# Patient Record
Sex: FEMALE | Race: WHITE | NOT HISPANIC OR LATINO
[De-identification: names, ages, dates, MRNs, and addresses within clinical notes are randomized per-mention and may not be internally consistent; named-entity substitution may affect disease eponyms.]

---

## 2023-07-09 ENCOUNTER — NON-APPOINTMENT (OUTPATIENT)
Age: 23
End: 2023-07-09

## 2023-07-10 ENCOUNTER — RESULT REVIEW (OUTPATIENT)
Age: 23
End: 2023-07-10

## 2024-09-04 ENCOUNTER — APPOINTMENT (OUTPATIENT)
Dept: OBGYN | Facility: CLINIC | Age: 24
End: 2024-09-04

## 2024-09-04 ENCOUNTER — APPOINTMENT (OUTPATIENT)
Dept: OBGYN | Facility: CLINIC | Age: 24
End: 2024-09-04
Payer: COMMERCIAL

## 2024-09-04 PROCEDURE — 76856 US EXAM PELVIC COMPLETE: CPT | Mod: 59

## 2024-09-04 PROCEDURE — 36415 COLL VENOUS BLD VENIPUNCTURE: CPT

## 2024-09-04 PROCEDURE — 81025 URINE PREGNANCY TEST: CPT

## 2024-09-04 PROCEDURE — 81002 URINALYSIS NONAUTO W/O SCOPE: CPT

## 2024-09-04 PROCEDURE — 76830 TRANSVAGINAL US NON-OB: CPT

## 2024-09-04 PROCEDURE — 99203 OFFICE O/P NEW LOW 30 MIN: CPT | Mod: 25

## 2024-09-17 ENCOUNTER — APPOINTMENT (OUTPATIENT)
Dept: OBGYN | Facility: CLINIC | Age: 24
End: 2024-09-17
Payer: COMMERCIAL

## 2024-09-17 PROCEDURE — 76817 TRANSVAGINAL US OBSTETRIC: CPT

## 2024-09-17 PROCEDURE — 36415 COLL VENOUS BLD VENIPUNCTURE: CPT

## 2024-09-17 PROCEDURE — 99214 OFFICE O/P EST MOD 30 MIN: CPT

## 2024-09-17 PROCEDURE — 81002 URINALYSIS NONAUTO W/O SCOPE: CPT

## 2024-10-08 ENCOUNTER — APPOINTMENT (OUTPATIENT)
Dept: OBGYN | Facility: CLINIC | Age: 24
End: 2024-10-08
Payer: COMMERCIAL

## 2024-10-08 PROCEDURE — 81002 URINALYSIS NONAUTO W/O SCOPE: CPT

## 2024-10-08 PROCEDURE — 0502F SUBSEQUENT PRENATAL CARE: CPT

## 2024-10-08 PROCEDURE — 76817 TRANSVAGINAL US OBSTETRIC: CPT

## 2024-10-23 ENCOUNTER — ASOB RESULT (OUTPATIENT)
Age: 24
End: 2024-10-23

## 2024-10-23 ENCOUNTER — APPOINTMENT (OUTPATIENT)
Dept: ANTEPARTUM | Facility: CLINIC | Age: 24
End: 2024-10-23

## 2024-10-23 PROCEDURE — 76801 OB US < 14 WKS SINGLE FETUS: CPT | Mod: 59

## 2024-10-23 PROCEDURE — 76813 OB US NUCHAL MEAS 1 GEST: CPT

## 2024-11-19 ENCOUNTER — APPOINTMENT (OUTPATIENT)
Dept: OBGYN | Facility: CLINIC | Age: 24
End: 2024-11-19
Payer: COMMERCIAL

## 2024-11-19 PROCEDURE — 0502F SUBSEQUENT PRENATAL CARE: CPT

## 2024-11-19 PROCEDURE — 81002 URINALYSIS NONAUTO W/O SCOPE: CPT

## 2024-11-19 PROCEDURE — 36415 COLL VENOUS BLD VENIPUNCTURE: CPT

## 2024-12-20 ENCOUNTER — ASOB RESULT (OUTPATIENT)
Age: 24
End: 2024-12-20

## 2024-12-20 ENCOUNTER — APPOINTMENT (OUTPATIENT)
Dept: ANTEPARTUM | Facility: CLINIC | Age: 24
End: 2024-12-20
Payer: COMMERCIAL

## 2024-12-20 DIAGNOSIS — O35.9XX0 MATERNAL CARE FOR (SUSPECTED) FETAL ABNORMALITY AND DAMAGE, UNSPECIFIED, NOT APPLICABLE OR UNSPECIFIED: ICD-10-CM

## 2024-12-20 PROCEDURE — 76805 OB US >/= 14 WKS SNGL FETUS: CPT

## 2025-01-02 ENCOUNTER — APPOINTMENT (OUTPATIENT)
Dept: PEDIATRIC CARDIOLOGY | Facility: CLINIC | Age: 25
End: 2025-01-02
Payer: COMMERCIAL

## 2025-01-02 PROCEDURE — 76825 ECHO EXAM OF FETAL HEART: CPT

## 2025-01-02 PROCEDURE — 76820 UMBILICAL ARTERY ECHO: CPT

## 2025-01-02 PROCEDURE — 76821 MIDDLE CEREBRAL ARTERY ECHO: CPT

## 2025-01-02 PROCEDURE — 99203 OFFICE O/P NEW LOW 30 MIN: CPT

## 2025-01-02 PROCEDURE — 76827 ECHO EXAM OF FETAL HEART: CPT

## 2025-01-14 ENCOUNTER — APPOINTMENT (OUTPATIENT)
Dept: OBGYN | Facility: CLINIC | Age: 25
End: 2025-01-14
Payer: COMMERCIAL

## 2025-01-14 PROCEDURE — 81002 URINALYSIS NONAUTO W/O SCOPE: CPT

## 2025-01-14 PROCEDURE — 0502F SUBSEQUENT PRENATAL CARE: CPT

## 2025-01-16 ENCOUNTER — APPOINTMENT (OUTPATIENT)
Dept: PEDIATRIC CARDIOLOGY | Facility: CLINIC | Age: 25
End: 2025-01-16
Payer: COMMERCIAL

## 2025-01-16 PROCEDURE — 76821 MIDDLE CEREBRAL ARTERY ECHO: CPT | Mod: 26

## 2025-01-16 PROCEDURE — 76826 ECHO EXAM OF FETAL HEART: CPT

## 2025-01-16 PROCEDURE — 99212 OFFICE O/P EST SF 10 MIN: CPT

## 2025-01-16 PROCEDURE — 76828 ECHO EXAM OF FETAL HEART: CPT

## 2025-01-16 PROCEDURE — 76820 UMBILICAL ARTERY ECHO: CPT | Mod: 26

## 2025-02-11 ENCOUNTER — APPOINTMENT (OUTPATIENT)
Dept: OBGYN | Facility: CLINIC | Age: 25
End: 2025-02-11
Payer: COMMERCIAL

## 2025-02-11 PROCEDURE — 36415 COLL VENOUS BLD VENIPUNCTURE: CPT

## 2025-02-11 PROCEDURE — 0502F SUBSEQUENT PRENATAL CARE: CPT

## 2025-02-11 PROCEDURE — 81002 URINALYSIS NONAUTO W/O SCOPE: CPT

## 2025-03-04 ENCOUNTER — APPOINTMENT (OUTPATIENT)
Dept: OBGYN | Facility: CLINIC | Age: 25
End: 2025-03-04
Payer: COMMERCIAL

## 2025-03-04 PROCEDURE — 81002 URINALYSIS NONAUTO W/O SCOPE: CPT

## 2025-03-04 PROCEDURE — 0502F SUBSEQUENT PRENATAL CARE: CPT

## 2025-03-19 ENCOUNTER — TRANSCRIPTION ENCOUNTER (OUTPATIENT)
Age: 25
End: 2025-03-19

## 2025-03-19 ENCOUNTER — APPOINTMENT (OUTPATIENT)
Dept: OBGYN | Facility: CLINIC | Age: 25
End: 2025-03-19
Payer: COMMERCIAL

## 2025-03-19 ENCOUNTER — APPOINTMENT (OUTPATIENT)
Dept: ANTEPARTUM | Facility: CLINIC | Age: 25
End: 2025-03-19

## 2025-03-19 PROCEDURE — 81002 URINALYSIS NONAUTO W/O SCOPE: CPT

## 2025-03-19 PROCEDURE — 76816 OB US FOLLOW-UP PER FETUS: CPT

## 2025-03-19 PROCEDURE — 0502F SUBSEQUENT PRENATAL CARE: CPT

## 2025-03-19 PROCEDURE — 76820 UMBILICAL ARTERY ECHO: CPT | Mod: 59

## 2025-03-19 PROCEDURE — 76819 FETAL BIOPHYS PROFIL W/O NST: CPT | Mod: 59

## 2025-03-19 PROCEDURE — 36415 COLL VENOUS BLD VENIPUNCTURE: CPT

## 2025-03-28 ENCOUNTER — APPOINTMENT (OUTPATIENT)
Dept: ANTEPARTUM | Facility: CLINIC | Age: 25
End: 2025-03-28

## 2025-03-28 ENCOUNTER — APPOINTMENT (OUTPATIENT)
Dept: ANTEPARTUM | Facility: CLINIC | Age: 25
End: 2025-03-28
Payer: COMMERCIAL

## 2025-03-28 ENCOUNTER — ASOB RESULT (OUTPATIENT)
Age: 25
End: 2025-03-28

## 2025-03-28 ENCOUNTER — INPATIENT (INPATIENT)
Facility: HOSPITAL | Age: 25
LOS: 8 days | Discharge: HOME HEALTH | End: 2025-04-06
Attending: OBSTETRICS & GYNECOLOGY | Admitting: OBSTETRICS & GYNECOLOGY
Payer: COMMERCIAL

## 2025-03-28 VITALS — TEMPERATURE: 99 F

## 2025-03-28 DIAGNOSIS — O36.5990 MATERNAL CARE FOR OTHER KNOWN OR SUSPECTED POOR FETAL GROWTH, UNSPECIFIED TRIMESTER, NOT APPLICABLE OR UNSPECIFIED: ICD-10-CM

## 2025-03-28 DIAGNOSIS — O14.10 SEVERE PRE-ECLAMPSIA, UNSPECIFIED TRIMESTER: ICD-10-CM

## 2025-03-28 DIAGNOSIS — O26.899 OTHER SPECIFIED PREGNANCY RELATED CONDITIONS, UNSPECIFIED TRIMESTER: ICD-10-CM

## 2025-03-28 LAB
ALBUMIN SERPL ELPH-MCNC: 3.1 G/DL — LOW (ref 3.3–5)
ALP SERPL-CCNC: 238 U/L — HIGH (ref 40–120)
ALT FLD-CCNC: 12 U/L — SIGNIFICANT CHANGE UP (ref 4–33)
ANION GAP SERPL CALC-SCNC: 11 MMOL/L — SIGNIFICANT CHANGE UP (ref 7–14)
APPEARANCE UR: ABNORMAL
AST SERPL-CCNC: 20 U/L — SIGNIFICANT CHANGE UP (ref 4–32)
BACTERIA # UR AUTO: ABNORMAL /HPF
BASOPHILS # BLD AUTO: 0.04 K/UL — SIGNIFICANT CHANGE UP (ref 0–0.2)
BASOPHILS NFR BLD AUTO: 0.4 % — SIGNIFICANT CHANGE UP (ref 0–2)
BILIRUB SERPL-MCNC: <0.2 MG/DL — SIGNIFICANT CHANGE UP (ref 0.2–1.2)
BILIRUB UR-MCNC: NEGATIVE — SIGNIFICANT CHANGE UP
BLD GP AB SCN SERPL QL: NEGATIVE — SIGNIFICANT CHANGE UP
BUN SERPL-MCNC: 19 MG/DL — SIGNIFICANT CHANGE UP (ref 7–23)
CALCIUM SERPL-MCNC: 8.9 MG/DL — SIGNIFICANT CHANGE UP (ref 8.4–10.5)
CAST: 3 /LPF — SIGNIFICANT CHANGE UP (ref 0–4)
CHLORIDE SERPL-SCNC: 105 MMOL/L — SIGNIFICANT CHANGE UP (ref 98–107)
CO2 SERPL-SCNC: 21 MMOL/L — LOW (ref 22–31)
COLOR SPEC: SIGNIFICANT CHANGE UP
CREAT ?TM UR-MCNC: 298 MG/DL — SIGNIFICANT CHANGE UP
CREAT SERPL-MCNC: 0.89 MG/DL — SIGNIFICANT CHANGE UP (ref 0.5–1.3)
DIFF PNL FLD: ABNORMAL
EGFR: 93 ML/MIN/1.73M2 — SIGNIFICANT CHANGE UP
EGFR: 93 ML/MIN/1.73M2 — SIGNIFICANT CHANGE UP
EOSINOPHIL # BLD AUTO: 0.01 K/UL — SIGNIFICANT CHANGE UP (ref 0–0.5)
EOSINOPHIL NFR BLD AUTO: 0.1 % — SIGNIFICANT CHANGE UP (ref 0–6)
FINE GRAN CASTS #/AREA URNS AUTO: PRESENT
GLUCOSE SERPL-MCNC: 83 MG/DL — SIGNIFICANT CHANGE UP (ref 70–99)
GLUCOSE UR QL: NEGATIVE MG/DL — SIGNIFICANT CHANGE UP
HCT VFR BLD CALC: 33.7 % — LOW (ref 34.5–45)
HGB BLD-MCNC: 11.3 G/DL — LOW (ref 11.5–15.5)
IANC: 8.44 K/UL — HIGH (ref 1.8–7.4)
IMM GRANULOCYTES NFR BLD AUTO: 0.4 % — SIGNIFICANT CHANGE UP (ref 0–0.9)
KETONES UR-MCNC: ABNORMAL MG/DL
LDH SERPL L TO P-CCNC: 260 U/L — HIGH (ref 135–225)
LEUKOCYTE ESTERASE UR-ACNC: NEGATIVE — SIGNIFICANT CHANGE UP
LYMPHOCYTES # BLD AUTO: 1.53 K/UL — SIGNIFICANT CHANGE UP (ref 1–3.3)
LYMPHOCYTES # BLD AUTO: 14.1 % — SIGNIFICANT CHANGE UP (ref 13–44)
MAGNESIUM SERPL-MCNC: 6.8 MG/DL — HIGH (ref 1.6–2.6)
MCHC RBC-ENTMCNC: 28.5 PG — SIGNIFICANT CHANGE UP (ref 27–34)
MCHC RBC-ENTMCNC: 33.5 G/DL — SIGNIFICANT CHANGE UP (ref 32–36)
MCV RBC AUTO: 84.9 FL — SIGNIFICANT CHANGE UP (ref 80–100)
MONOCYTES # BLD AUTO: 0.79 K/UL — SIGNIFICANT CHANGE UP (ref 0–0.9)
MONOCYTES NFR BLD AUTO: 7.3 % — SIGNIFICANT CHANGE UP (ref 2–14)
NEUTROPHILS # BLD AUTO: 8.44 K/UL — HIGH (ref 1.8–7.4)
NEUTROPHILS NFR BLD AUTO: 77.7 % — HIGH (ref 43–77)
NITRITE UR-MCNC: NEGATIVE — SIGNIFICANT CHANGE UP
NRBC # BLD AUTO: 0 K/UL — SIGNIFICANT CHANGE UP (ref 0–0)
NRBC # FLD: 0 K/UL — SIGNIFICANT CHANGE UP (ref 0–0)
NRBC BLD AUTO-RTO: 0 /100 WBCS — SIGNIFICANT CHANGE UP (ref 0–0)
PH UR: 6 — SIGNIFICANT CHANGE UP (ref 5–8)
PLATELET # BLD AUTO: 258 K/UL — SIGNIFICANT CHANGE UP (ref 150–400)
POTASSIUM SERPL-MCNC: 4.1 MMOL/L — SIGNIFICANT CHANGE UP (ref 3.5–5.3)
POTASSIUM SERPL-SCNC: 4.1 MMOL/L — SIGNIFICANT CHANGE UP (ref 3.5–5.3)
PROT ?TM UR-MCNC: >2000 MG/DL — SIGNIFICANT CHANGE UP
PROT SERPL-MCNC: 6 G/DL — SIGNIFICANT CHANGE UP (ref 6–8.3)
PROT UR-MCNC: >=1000 MG/DL
PROT/CREAT UR-RTO: SIGNIFICANT CHANGE UP RATIO (ref 0–0.2)
RBC # BLD: 3.97 M/UL — SIGNIFICANT CHANGE UP (ref 3.8–5.2)
RBC # FLD: 11.9 % — SIGNIFICANT CHANGE UP (ref 10.3–14.5)
RBC CASTS # UR COMP ASSIST: 2 /HPF — SIGNIFICANT CHANGE UP (ref 0–4)
REVIEW: SIGNIFICANT CHANGE UP
RH IG SCN BLD-IMP: POSITIVE — SIGNIFICANT CHANGE UP
RH IG SCN BLD-IMP: POSITIVE — SIGNIFICANT CHANGE UP
SODIUM SERPL-SCNC: 137 MMOL/L — SIGNIFICANT CHANGE UP (ref 135–145)
SP GR SPEC: 1.04 — HIGH (ref 1–1.03)
SQUAMOUS # UR AUTO: 10 /HPF — HIGH (ref 0–5)
T PALLIDUM AB TITR SER: NEGATIVE — SIGNIFICANT CHANGE UP
URATE SERPL-MCNC: 5.4 MG/DL — SIGNIFICANT CHANGE UP (ref 2.5–7)
UROBILINOGEN FLD QL: 1 MG/DL — SIGNIFICANT CHANGE UP (ref 0.2–1)
WBC # BLD: 10.85 K/UL — HIGH (ref 3.8–10.5)
WBC # FLD AUTO: 10.85 K/UL — HIGH (ref 3.8–10.5)
WBC UR QL: 5 /HPF — SIGNIFICANT CHANGE UP (ref 0–5)

## 2025-03-28 PROCEDURE — 76819 FETAL BIOPHYS PROFIL W/O NST: CPT

## 2025-03-28 PROCEDURE — 99223 1ST HOSP IP/OBS HIGH 75: CPT | Mod: 25

## 2025-03-28 PROCEDURE — 59200 INSERT CERVICAL DILATOR: CPT

## 2025-03-28 PROCEDURE — 76815 OB US LIMITED FETUS(S): CPT | Mod: 26

## 2025-03-28 PROCEDURE — 76818 FETAL BIOPHYS PROFILE W/NST: CPT | Mod: 26,59

## 2025-03-28 RX ORDER — NIFEDIPINE 30 MG
30 TABLET, EXTENDED RELEASE 24 HR ORAL ONCE
Refills: 0 | Status: COMPLETED | OUTPATIENT
Start: 2025-03-28 | End: 2025-03-28

## 2025-03-28 RX ORDER — LABETALOL HYDROCHLORIDE 200 MG/1
20 TABLET, FILM COATED ORAL ONCE
Refills: 0 | Status: COMPLETED | OUTPATIENT
Start: 2025-03-28 | End: 2025-03-28

## 2025-03-28 RX ORDER — SODIUM CHLORIDE 9 G/1000ML
1000 INJECTION, SOLUTION INTRAVENOUS
Refills: 0 | Status: DISCONTINUED | OUTPATIENT
Start: 2025-03-28 | End: 2025-03-29

## 2025-03-28 RX ORDER — LABETALOL HYDROCHLORIDE 200 MG/1
40 TABLET, FILM COATED ORAL ONCE
Refills: 0 | Status: COMPLETED | OUTPATIENT
Start: 2025-03-28 | End: 2025-03-28

## 2025-03-28 RX ORDER — MAGNESIUM SULFATE 500 MG/ML
4 SYRINGE (ML) INJECTION ONCE
Refills: 0 | Status: COMPLETED | OUTPATIENT
Start: 2025-03-28 | End: 2025-03-28

## 2025-03-28 RX ORDER — OXYTOCIN-SODIUM CHLORIDE 0.9% IV SOLN 30 UNIT/500ML 30-0.9/5 UT/ML-%
SOLUTION INTRAVENOUS
Qty: 30 | Refills: 0 | Status: DISCONTINUED | OUTPATIENT
Start: 2025-03-28 | End: 2025-03-28

## 2025-03-28 RX ORDER — MAGNESIUM SULFATE 500 MG/ML
2 SYRINGE (ML) INJECTION
Qty: 40 | Refills: 0 | Status: DISCONTINUED | OUTPATIENT
Start: 2025-03-28 | End: 2025-03-29

## 2025-03-28 RX ORDER — SODIUM CHLORIDE 9 G/1000ML
1000 INJECTION, SOLUTION INTRAVENOUS
Refills: 0 | Status: DISCONTINUED | OUTPATIENT
Start: 2025-03-28 | End: 2025-03-28

## 2025-03-28 RX ORDER — NIFEDIPINE 30 MG
60 TABLET, EXTENDED RELEASE 24 HR ORAL DAILY
Refills: 0 | Status: DISCONTINUED | OUTPATIENT
Start: 2025-03-28 | End: 2025-03-29

## 2025-03-28 RX ORDER — LABETALOL HYDROCHLORIDE 200 MG/1
80 TABLET, FILM COATED ORAL ONCE
Refills: 0 | Status: COMPLETED | OUTPATIENT
Start: 2025-03-28 | End: 2025-03-28

## 2025-03-28 RX ORDER — AMPICILLIN SODIUM 1 G/1
2 INJECTION, POWDER, FOR SOLUTION INTRAMUSCULAR; INTRAVENOUS ONCE
Refills: 0 | Status: COMPLETED | OUTPATIENT
Start: 2025-03-28 | End: 2025-03-28

## 2025-03-28 RX ORDER — NIFEDIPINE 30 MG
20 TABLET, EXTENDED RELEASE 24 HR ORAL ONCE
Refills: 0 | Status: DISCONTINUED | OUTPATIENT
Start: 2025-03-28 | End: 2025-03-29

## 2025-03-28 RX ORDER — OXYTOCIN-SODIUM CHLORIDE 0.9% IV SOLN 30 UNIT/500ML 30-0.9/5 UT/ML-%
SOLUTION INTRAVENOUS
Qty: 30 | Refills: 0 | Status: DISCONTINUED | OUTPATIENT
Start: 2025-03-28 | End: 2025-03-29

## 2025-03-28 RX ORDER — AMPICILLIN SODIUM 1 G/1
1 INJECTION, POWDER, FOR SOLUTION INTRAMUSCULAR; INTRAVENOUS EVERY 4 HOURS
Refills: 0 | Status: DISCONTINUED | OUTPATIENT
Start: 2025-03-28 | End: 2025-03-29

## 2025-03-28 RX ORDER — OXYTOCIN-SODIUM CHLORIDE 0.9% IV SOLN 30 UNIT/500ML 30-0.9/5 UT/ML-%
167 SOLUTION INTRAVENOUS
Qty: 30 | Refills: 0 | Status: DISCONTINUED | OUTPATIENT
Start: 2025-03-28 | End: 2025-03-30

## 2025-03-28 RX ORDER — NIFEDIPINE 30 MG
10 TABLET, EXTENDED RELEASE 24 HR ORAL ONCE
Refills: 0 | Status: COMPLETED | OUTPATIENT
Start: 2025-03-28 | End: 2025-03-28

## 2025-03-28 RX ORDER — NIFEDIPINE 30 MG
30 TABLET, EXTENDED RELEASE 24 HR ORAL DAILY
Refills: 0 | Status: DISCONTINUED | OUTPATIENT
Start: 2025-03-28 | End: 2025-03-28

## 2025-03-28 RX ADMIN — Medication 12 MILLIGRAM(S): at 12:23

## 2025-03-28 RX ADMIN — SODIUM CHLORIDE 50 MILLILITER(S): 9 INJECTION, SOLUTION INTRAVENOUS at 11:12

## 2025-03-28 RX ADMIN — Medication 30 MILLIGRAM(S): at 15:26

## 2025-03-28 RX ADMIN — LABETALOL HYDROCHLORIDE 80 MILLIGRAM(S): 200 TABLET, FILM COATED ORAL at 11:17

## 2025-03-28 RX ADMIN — Medication 3 MILLILITER(S): at 16:36

## 2025-03-28 RX ADMIN — LABETALOL HYDROCHLORIDE 80 MILLIGRAM(S): 200 TABLET, FILM COATED ORAL at 15:27

## 2025-03-28 RX ADMIN — LABETALOL HYDROCHLORIDE 20 MILLIGRAM(S): 200 TABLET, FILM COATED ORAL at 10:08

## 2025-03-28 RX ADMIN — AMPICILLIN SODIUM 200 GRAM(S): 1 INJECTION, POWDER, FOR SOLUTION INTRAMUSCULAR; INTRAVENOUS at 18:56

## 2025-03-28 RX ADMIN — Medication 30 MILLIGRAM(S): at 12:05

## 2025-03-28 RX ADMIN — Medication 50 GM/HR: at 11:29

## 2025-03-28 RX ADMIN — Medication 10 MILLIGRAM(S): at 14:43

## 2025-03-28 RX ADMIN — Medication 50 GM/HR: at 19:38

## 2025-03-28 RX ADMIN — LABETALOL HYDROCHLORIDE 40 MILLIGRAM(S): 200 TABLET, FILM COATED ORAL at 10:33

## 2025-03-28 RX ADMIN — SODIUM CHLORIDE 50 MILLILITER(S): 9 INJECTION, SOLUTION INTRAVENOUS at 19:37

## 2025-03-28 RX ADMIN — Medication 300 GRAM(S): at 11:07

## 2025-03-28 RX ADMIN — Medication 3 MILLILITER(S): at 22:48

## 2025-03-28 RX ADMIN — AMPICILLIN SODIUM 100 GRAM(S): 1 INJECTION, POWDER, FOR SOLUTION INTRAMUSCULAR; INTRAVENOUS at 22:59

## 2025-03-28 RX ADMIN — OXYTOCIN-SODIUM CHLORIDE 0.9% IV SOLN 30 UNIT/500ML 2 MILLIUNIT(S)/MIN: 30-0.9/5 SOLUTION at 19:37

## 2025-03-28 RX ADMIN — LABETALOL HYDROCHLORIDE 20 MILLIGRAM(S): 200 TABLET, FILM COATED ORAL at 14:38

## 2025-03-28 NOTE — OB PROVIDER LABOR PROGRESS NOTE - ASSESSMENT
24y G1 @34w4d admitted for IOL for sPEC    - Continue Mg gtt for sPEC  - To start induction    Crissy Shaw PGY2

## 2025-03-28 NOTE — OB PROVIDER LABOR PROGRESS NOTE - ASSESSMENT
IOL for sPEC  - making cervical change  - not AROMable at this time  - cervical balloon still in place A  - start janette Davis, PGY2

## 2025-03-28 NOTE — OB PROVIDER H&P - ASSESSMENT
25 y/o  @ 34.4 wks gestation with sPEC   plan of care d/w dr Tomlinson   admit to l&D  sPEC @ 34.4 wks gestation for Labetalol / Betamethasone / Magnesium Sulfate   see admission orders 23 y/o  @ 34.4 wks gestation with sPEC / SGA   plan of care d/w dr Tomlinson / Dr Beckham   admit to l&D  sPEC @ 34.4 wks gestation for Labetalol / Betamethasone / Magnesium Sulfate / Procardia   see admission orders

## 2025-03-28 NOTE — OB PROVIDER H&P - NSHPPHYSICALEXAM_GEN_ALL_CORE
GENERAL: pt in  NAD,   HEAD:  Atraumatic, normocephalic  EYES: PERRLA, conjunctiva and sclera clear  NECK: Supple, nontender   HEART: Regular rate and rhythm,   LUNGS: Unlabored respirations.  Clear to auscultation bilaterally   ABDOMEN: Soft, nontender, gravid     EXTREMITIES: 2+ peripheral pulses bilaterally. No clubbing, cyanosis, or edema  NERVOUS SYSTEM:  A&Ox3, moving all extremities, no focal deficits   SKIN: No rashes or lesions    vitals   T(C): 37.1 (03-28-25 @ 10:01), Max: 37.1 (03-28-25 @ 10:01)  HR: 111 (03-28-25 @ 10:03) (111 - 111)  BP: 187/131 (03-28-25 @ 10:03) (187/131 - 187/131) high fowlers position   1008 Labetalol 20 mg IVP   1019 179/107 P: 76    NST  Baseline  (   140       ) BPM  Variability (  X)  Moderate   (  ) Minimal  (  ) Absent  (  )  Marked  Accelerations ( X ) 15x15   (  ) 10x10  (  ) no  Decelerations ( X ) no  (  ) Variable  (  ) Early  (  ) Late      Description _________  Contractions ( X ) no  (  ) yes     Description  __________  Interpretation ( X ) reactive   (  )  non-reactive GENERAL: pt in  NAD,   HEAD:  Atraumatic, normocephalic  EYES: PERRLA, conjunctiva and sclera clear  NECK: Supple, nontender   HEART: Regular rate and rhythm,   LUNGS: Unlabored respirations.  Clear to auscultation bilaterally   ABDOMEN: Soft, nontender, gravid     EXTREMITIES: 2+ peripheral pulses bilaterally. No clubbing, cyanosis, or edema  NERVOUS SYSTEM:  A&Ox3, moving all extremities, no focal deficits   SKIN: No rashes or lesions    TAS cephalic to confirm presentation     vitals   T(C): 37.1 (03-28-25 @ 10:01), Max: 37.1 (03-28-25 @ 10:01)  HR: 111 (03-28-25 @ 10:03) (111 - 111)  BP: 187/131 (03-28-25 @ 10:03) (187/131 - 187/131) high fowlers position   1008 Labetalol 20 mg IVP   1019 179/107 P: 76  1029 186/115 P: 80  1034 Labetalol 40 mg IVP   1050 158/113 P: 85  NST  Baseline  (   140       ) BPM  Variability (  X)  Moderate   (  ) Minimal  (  ) Absent  (  )  Marked  Accelerations ( X ) 15x15   (  ) 10x10  (  ) no  Decelerations ( X ) no  (  ) Variable  (  ) Early  (  ) Late      Description _________  Contractions ( X ) no  (  ) yes     Description  __________  Interpretation ( X ) reactive   (  )  non-reactive

## 2025-03-28 NOTE — OB RN TRIAGE NOTE - FALL HARM RISK - UNIVERSAL INTERVENTIONS
Bed in lowest position, wheels locked, appropriate side rails in place/Call bell, personal items and telephone in reach/Instruct patient to call for assistance before getting out of bed or chair/Non-slip footwear when patient is out of bed/[place] to call system/Physically safe environment - no spills, clutter or unnecessary equipment/Purposeful Proactive Rounding/Room/bathroom lighting operational, light cord in reach

## 2025-03-28 NOTE — OB RN PATIENT PROFILE - ABILITY TO HEAR (WITH HEARING AID OR HEARING APPLIANCE IF NORMALLY USED):
12/16/20 1555   Oxygen Therapy   O2 Sat (%) 100 %   Pulse via Oximetry 57 beats per minute   O2 Device BIPAP   FIO2 (%) 40 %   Respiratory   Respiratory (WDL) X   CPAP/BIPAP   CPAP/BIPAP Start/Stop On   Device Mode BIPAP   $$ Bipap Daily Yes   Mask Type and Size Large  (non-vented)   Skin Condition intact   PIP Observed 18 cm H20   IPAP (cm H2O) 14 cm H2O   EPAP (cm H2O) 8 cm H2O   Inspiratory Time (sec) 0.9 seconds   Vt Spont (ml) 1110 ml   Ve Observed (l/min) 29.3 l/min   Backup Rate 8   Total RR (Spontaneous) 26 breaths per minute   Insp Rise Time (sec) 3   Leak (Estimated) 6 L/min   Pt's Home Machine No   Biomedical Check Performed Yes   Settings Verified Yes   Alarm Settings   High Pressure 30   Low Pressure 5   Apnea 20   Low Ve 3   High Rate 50   Low Rate 8   Pulmonary Toilet   Pulmonary Toilet Cough and deep breath;H. O.B elevated Adequate: hears normal conversation without difficulty

## 2025-03-28 NOTE — OB PROVIDER LABOR PROGRESS NOTE - ASSESSMENT
24y G1 @34w4d admitted for IOL for sPEC    - Cervical balloon placed in usual fashion  - IOL with buccal cytotec  - Continue Mg gtt for sPEC    Plan per Dr. Davi Shaw PGY2

## 2025-03-28 NOTE — CONSULT NOTE PEDS - SUBJECTIVE AND OBJECTIVE BOX
Ms. Gomez is a 25 y/o  admitted at 34w4d gestational age. Prenatal history notable for sPEC diagnosed on 3/28. Pregnancy complicated by IUGR (estimated fetal weight is 4lb 12oz).     I met with Ms. Gomez and her partner and discussed what to expect should she deliver at 33 weeks gestation. We discussed the followin. The NICU team will be present at her delivery and will immediately assess and care for her infant.    2. The infant may require respiratory support, most commonly in the form of nasal CPAP. While unlikely, there is a small possibility that the infant would require intubation and mechanical ventilation.    3. Depending on the clinical status of the infant, enteral feedings may or may not be started immediately. The infant will receive IVF/IV nutrition as necessary. Due to immature suck/swallow, orogastric or nasogastric tube may be required once feeds are initiated. The infant is also at risk for hypoglycemia due to prematurity.    4. Discussed the benefits of breastfeeding in  infants and encouraged mother to pump following delivery.    5. The infant will be at risk for jaundice which can be treated with phototherapy.    6. The infant will be screened for infection and treated with antibiotics if deemed clinically necessary.    7. The infant is at risk for thermoregulation issues.    8. The infant is at risk for developmental delays as a consequence of prematurity. The infant will be evaluated by a developmental pediatrician to monitor for neurodevelopmental delays.    9. Length of stay is highly variable, but at this gestational age, the average length of stay is 10 days. Reviewed discharge criteria.    Ms. Gomez and her partner had the opportunity to ask questions and may contact the NICU at any time if further questions arise.    Thank you for the opportunity to participate in the care of this patient and please inform us of any changes in her status.

## 2025-03-28 NOTE — OB PROVIDER H&P - HISTORY OF PRESENT ILLNESS
PNC with Dr Salinas   25 y/o  @ 34.4 wks gestation presents from [facility] with elevated BP's of 153/96 and 170/ 110 , pt being followed by M for SGA EFW: 4#12 12% and AC 6% BPP was 8/8 pt denies any headache visual disturbances or right upper epigastric pain denies any uc's vb or lof reports +FM denies any n/v/d denies any fever or chills ap care comp by :  SGA   EFW: 4#12 12%  AC: 6%

## 2025-03-29 ENCOUNTER — TRANSCRIPTION ENCOUNTER (OUTPATIENT)
Age: 25
End: 2025-03-29

## 2025-03-29 DIAGNOSIS — O14.10 SEVERE PRE-ECLAMPSIA, UNSPECIFIED TRIMESTER: ICD-10-CM

## 2025-03-29 LAB
ALBUMIN SERPL ELPH-MCNC: 3.2 G/DL — LOW (ref 3.3–5)
ALP SERPL-CCNC: 244 U/L — HIGH (ref 40–120)
ALT FLD-CCNC: 13 U/L — SIGNIFICANT CHANGE UP (ref 4–33)
ANION GAP SERPL CALC-SCNC: 16 MMOL/L — HIGH (ref 7–14)
AST SERPL-CCNC: 23 U/L — SIGNIFICANT CHANGE UP (ref 4–32)
BASOPHILS # BLD AUTO: 0.02 K/UL — SIGNIFICANT CHANGE UP (ref 0–0.2)
BASOPHILS NFR BLD AUTO: 0.1 % — SIGNIFICANT CHANGE UP (ref 0–2)
BILIRUB SERPL-MCNC: 0.2 MG/DL — SIGNIFICANT CHANGE UP (ref 0.2–1.2)
BUN SERPL-MCNC: 21 MG/DL — SIGNIFICANT CHANGE UP (ref 7–23)
CALCIUM SERPL-MCNC: 8.2 MG/DL — LOW (ref 8.4–10.5)
CHLORIDE SERPL-SCNC: 99 MMOL/L — SIGNIFICANT CHANGE UP (ref 98–107)
CO2 SERPL-SCNC: 15 MMOL/L — LOW (ref 22–31)
CREAT SERPL-MCNC: 0.96 MG/DL — SIGNIFICANT CHANGE UP (ref 0.5–1.3)
EGFR: 85 ML/MIN/1.73M2 — SIGNIFICANT CHANGE UP
EGFR: 85 ML/MIN/1.73M2 — SIGNIFICANT CHANGE UP
EOSINOPHIL # BLD AUTO: 0 K/UL — SIGNIFICANT CHANGE UP (ref 0–0.5)
EOSINOPHIL NFR BLD AUTO: 0 % — SIGNIFICANT CHANGE UP (ref 0–6)
GLUCOSE SERPL-MCNC: 147 MG/DL — HIGH (ref 70–99)
HCT VFR BLD CALC: 34.9 % — SIGNIFICANT CHANGE UP (ref 34.5–45)
HGB BLD-MCNC: 11.5 G/DL — SIGNIFICANT CHANGE UP (ref 11.5–15.5)
IANC: 16.71 K/UL — HIGH (ref 1.8–7.4)
IMM GRANULOCYTES NFR BLD AUTO: 0.7 % — SIGNIFICANT CHANGE UP (ref 0–0.9)
LDH SERPL L TO P-CCNC: 284 U/L — HIGH (ref 135–225)
LYMPHOCYTES # BLD AUTO: 1.06 K/UL — SIGNIFICANT CHANGE UP (ref 1–3.3)
LYMPHOCYTES # BLD AUTO: 5.6 % — LOW (ref 13–44)
MAGNESIUM SERPL-MCNC: 6.4 MG/DL — HIGH (ref 1.6–2.6)
MAGNESIUM SERPL-MCNC: 6.4 MG/DL — HIGH (ref 1.6–2.6)
MAGNESIUM SERPL-MCNC: 6.7 MG/DL — HIGH (ref 1.6–2.6)
MAGNESIUM SERPL-MCNC: 8 MG/DL — CRITICAL HIGH (ref 1.6–2.6)
MAGNESIUM SERPL-MCNC: 8.2 MG/DL — CRITICAL HIGH (ref 1.6–2.6)
MCHC RBC-ENTMCNC: 28.6 PG — SIGNIFICANT CHANGE UP (ref 27–34)
MCHC RBC-ENTMCNC: 33 G/DL — SIGNIFICANT CHANGE UP (ref 32–36)
MCV RBC AUTO: 86.8 FL — SIGNIFICANT CHANGE UP (ref 80–100)
MONOCYTES # BLD AUTO: 0.92 K/UL — HIGH (ref 0–0.9)
MONOCYTES NFR BLD AUTO: 4.9 % — SIGNIFICANT CHANGE UP (ref 2–14)
NEUTROPHILS # BLD AUTO: 16.71 K/UL — HIGH (ref 1.8–7.4)
NEUTROPHILS NFR BLD AUTO: 88.7 % — HIGH (ref 43–77)
NRBC # BLD AUTO: 0 K/UL — SIGNIFICANT CHANGE UP (ref 0–0)
NRBC # FLD: 0 K/UL — SIGNIFICANT CHANGE UP (ref 0–0)
NRBC BLD AUTO-RTO: 0 /100 WBCS — SIGNIFICANT CHANGE UP (ref 0–0)
PLATELET # BLD AUTO: 279 K/UL — SIGNIFICANT CHANGE UP (ref 150–400)
POTASSIUM SERPL-MCNC: 4.2 MMOL/L — SIGNIFICANT CHANGE UP (ref 3.5–5.3)
POTASSIUM SERPL-SCNC: 4.2 MMOL/L — SIGNIFICANT CHANGE UP (ref 3.5–5.3)
PROT SERPL-MCNC: 6.3 G/DL — SIGNIFICANT CHANGE UP (ref 6–8.3)
RBC # BLD: 4.02 M/UL — SIGNIFICANT CHANGE UP (ref 3.8–5.2)
RBC # FLD: 12.1 % — SIGNIFICANT CHANGE UP (ref 10.3–14.5)
SODIUM SERPL-SCNC: 130 MMOL/L — LOW (ref 135–145)
URATE SERPL-MCNC: 6.5 MG/DL — SIGNIFICANT CHANGE UP (ref 2.5–7)
WBC # BLD: 18.84 K/UL — HIGH (ref 3.8–10.5)
WBC # FLD AUTO: 18.84 K/UL — HIGH (ref 3.8–10.5)

## 2025-03-29 PROCEDURE — 88307 TISSUE EXAM BY PATHOLOGIST: CPT | Mod: 26

## 2025-03-29 DEVICE — SURGICEL NU-KNIT 6 X 9": Type: IMPLANTABLE DEVICE | Status: FUNCTIONAL

## 2025-03-29 RX ORDER — DEXAMETHASONE 0.5 MG/1
4 TABLET ORAL EVERY 6 HOURS
Refills: 0 | Status: DISCONTINUED | OUTPATIENT
Start: 2025-03-29 | End: 2025-04-01

## 2025-03-29 RX ORDER — SODIUM CHLORIDE 9 G/1000ML
1000 INJECTION, SOLUTION INTRAVENOUS ONCE
Refills: 0 | Status: COMPLETED | OUTPATIENT
Start: 2025-03-29 | End: 2025-03-29

## 2025-03-29 RX ORDER — ACETAMINOPHEN 500 MG/5ML
1000 LIQUID (ML) ORAL ONCE
Refills: 0 | Status: COMPLETED | OUTPATIENT
Start: 2025-03-29 | End: 2025-03-30

## 2025-03-29 RX ORDER — MODIFIED LANOLIN 100 %
1 CREAM (GRAM) TOPICAL EVERY 6 HOURS
Refills: 0 | Status: DISCONTINUED | OUTPATIENT
Start: 2025-03-30 | End: 2025-04-06

## 2025-03-29 RX ORDER — KETOROLAC TROMETHAMINE 30 MG/ML
30 INJECTION, SOLUTION INTRAMUSCULAR; INTRAVENOUS EVERY 6 HOURS
Refills: 0 | Status: DISCONTINUED | OUTPATIENT
Start: 2025-03-30 | End: 2025-03-31

## 2025-03-29 RX ORDER — HEPARIN SODIUM 1000 [USP'U]/ML
5000 INJECTION INTRAVENOUS; SUBCUTANEOUS EVERY 12 HOURS
Refills: 0 | Status: DISCONTINUED | OUTPATIENT
Start: 2025-03-30 | End: 2025-04-06

## 2025-03-29 RX ORDER — MAGNESIUM SULFATE 500 MG/ML
1.5 SYRINGE (ML) INJECTION
Qty: 40 | Refills: 0 | Status: DISCONTINUED | OUTPATIENT
Start: 2025-03-29 | End: 2025-03-29

## 2025-03-29 RX ORDER — SIMETHICONE 80 MG
80 TABLET,CHEWABLE ORAL EVERY 4 HOURS
Refills: 0 | Status: DISCONTINUED | OUTPATIENT
Start: 2025-03-30 | End: 2025-04-06

## 2025-03-29 RX ORDER — MAGNESIUM SULFATE 500 MG/ML
1 SYRINGE (ML) INJECTION
Qty: 40 | Refills: 0 | Status: DISCONTINUED | OUTPATIENT
Start: 2025-03-29 | End: 2025-03-29

## 2025-03-29 RX ORDER — MAGNESIUM HYDROXIDE 400 MG/5ML
30 SUSPENSION ORAL
Refills: 0 | Status: DISCONTINUED | OUTPATIENT
Start: 2025-03-30 | End: 2025-04-06

## 2025-03-29 RX ORDER — SODIUM CHLORIDE 9 G/1000ML
1000 INJECTION, SOLUTION INTRAVENOUS
Refills: 0 | Status: DISCONTINUED | OUTPATIENT
Start: 2025-03-30 | End: 2025-04-02

## 2025-03-29 RX ORDER — NALBUPHINE HYDROCHLORIDE 10 MG/ML
2.5 INJECTION INTRAMUSCULAR; INTRAVENOUS; SUBCUTANEOUS EVERY 6 HOURS
Refills: 0 | Status: DISCONTINUED | OUTPATIENT
Start: 2025-03-29 | End: 2025-04-01

## 2025-03-29 RX ORDER — OXYCODONE HYDROCHLORIDE 30 MG/1
5 TABLET ORAL
Refills: 0 | Status: DISCONTINUED | OUTPATIENT
Start: 2025-03-30 | End: 2025-04-06

## 2025-03-29 RX ORDER — OXYCODONE HYDROCHLORIDE 30 MG/1
5 TABLET ORAL ONCE
Refills: 0 | Status: DISCONTINUED | OUTPATIENT
Start: 2025-03-30 | End: 2025-04-06

## 2025-03-29 RX ORDER — OXYCODONE HYDROCHLORIDE 30 MG/1
5 TABLET ORAL
Refills: 0 | Status: DISCONTINUED | OUTPATIENT
Start: 2025-03-29 | End: 2025-03-30

## 2025-03-29 RX ORDER — CLOSTRIDIUM TETANI TOXOID ANTIGEN (FORMALDEHYDE INACTIVATED), CORYNEBACTERIUM DIPHTHERIAE TOXOID ANTIGEN (FORMALDEHYDE INACTIVATED), BORDETELLA PERTUSSIS TOXOID ANTIGEN (GLUTARALDEHYDE INACTIVATED), BORDETELLA PERTUSSIS FILAMENTOUS HEMAGGLUTININ ANTIGEN (FORMALDEHYDE INACTIVATED), BORDETELLA PERTUSSIS PERTACTIN ANTIGEN, AND BORDETELLA PERTUSSIS FIMBRIAE 2/3 ANTIGEN 5; 2; 2.5; 5; 3; 5 [LF]/.5ML; [LF]/.5ML; UG/.5ML; UG/.5ML; UG/.5ML; UG/.5ML
0.5 INJECTION, SUSPENSION INTRAMUSCULAR ONCE
Refills: 0 | Status: COMPLETED | OUTPATIENT
Start: 2025-03-30

## 2025-03-29 RX ORDER — IBUPROFEN 200 MG
600 TABLET ORAL EVERY 6 HOURS
Refills: 0 | Status: DISCONTINUED | OUTPATIENT
Start: 2025-03-30 | End: 2025-03-30

## 2025-03-29 RX ORDER — DIPHENHYDRAMINE HCL 12.5MG/5ML
25 ELIXIR ORAL EVERY 6 HOURS
Refills: 0 | Status: DISCONTINUED | OUTPATIENT
Start: 2025-03-30 | End: 2025-04-06

## 2025-03-29 RX ORDER — SODIUM CHLORIDE 9 G/1000ML
1000 INJECTION, SOLUTION INTRAVENOUS
Refills: 0 | Status: DISCONTINUED | OUTPATIENT
Start: 2025-03-29 | End: 2025-03-29

## 2025-03-29 RX ORDER — OXYTOCIN-SODIUM CHLORIDE 0.9% IV SOLN 30 UNIT/500ML 30-0.9/5 UT/ML-%
42 SOLUTION INTRAVENOUS
Qty: 30 | Refills: 0 | Status: DISCONTINUED | OUTPATIENT
Start: 2025-03-30 | End: 2025-04-01

## 2025-03-29 RX ORDER — ACETAMINOPHEN 500 MG/5ML
975 LIQUID (ML) ORAL ONCE
Refills: 0 | Status: COMPLETED | OUTPATIENT
Start: 2025-03-29 | End: 2025-03-29

## 2025-03-29 RX ORDER — NALOXONE HYDROCHLORIDE 0.4 MG/ML
0.1 INJECTION, SOLUTION INTRAMUSCULAR; INTRAVENOUS; SUBCUTANEOUS
Refills: 0 | Status: DISCONTINUED | OUTPATIENT
Start: 2025-03-29 | End: 2025-04-01

## 2025-03-29 RX ORDER — ONDANSETRON HCL/PF 4 MG/2 ML
4 VIAL (ML) INJECTION EVERY 6 HOURS
Refills: 0 | Status: DISCONTINUED | OUTPATIENT
Start: 2025-03-29 | End: 2025-04-01

## 2025-03-29 RX ADMIN — Medication 60 MILLIGRAM(S): at 12:03

## 2025-03-29 RX ADMIN — Medication 3 MILLILITER(S): at 07:17

## 2025-03-29 RX ADMIN — SODIUM CHLORIDE 1000 MILLILITER(S): 9 INJECTION, SOLUTION INTRAVENOUS at 01:14

## 2025-03-29 RX ADMIN — Medication 975 MILLIGRAM(S): at 21:17

## 2025-03-29 RX ADMIN — Medication 25 GM/HR: at 02:29

## 2025-03-29 RX ADMIN — Medication 12 MILLIGRAM(S): at 12:21

## 2025-03-29 RX ADMIN — AMPICILLIN SODIUM 100 GRAM(S): 1 INJECTION, POWDER, FOR SOLUTION INTRAMUSCULAR; INTRAVENOUS at 11:00

## 2025-03-29 RX ADMIN — Medication 25 GM/HR: at 12:28

## 2025-03-29 RX ADMIN — Medication 975 MILLIGRAM(S): at 20:47

## 2025-03-29 RX ADMIN — Medication 3 MILLILITER(S): at 14:37

## 2025-03-29 RX ADMIN — AMPICILLIN SODIUM 100 GRAM(S): 1 INJECTION, POWDER, FOR SOLUTION INTRAMUSCULAR; INTRAVENOUS at 15:04

## 2025-03-29 RX ADMIN — AMPICILLIN SODIUM 100 GRAM(S): 1 INJECTION, POWDER, FOR SOLUTION INTRAMUSCULAR; INTRAVENOUS at 18:55

## 2025-03-29 RX ADMIN — Medication 1 APPLICATION(S): at 10:58

## 2025-03-29 RX ADMIN — Medication 25 GM/HR: at 07:14

## 2025-03-29 RX ADMIN — AMPICILLIN SODIUM 100 GRAM(S): 1 INJECTION, POWDER, FOR SOLUTION INTRAMUSCULAR; INTRAVENOUS at 07:00

## 2025-03-29 RX ADMIN — AMPICILLIN SODIUM 100 GRAM(S): 1 INJECTION, POWDER, FOR SOLUTION INTRAMUSCULAR; INTRAVENOUS at 02:59

## 2025-03-29 RX ADMIN — Medication 37.5 GM/HR: at 00:28

## 2025-03-29 RX ADMIN — Medication 25 GM/HR: at 19:14

## 2025-03-29 NOTE — CHART NOTE - NSCHARTNOTEFT_GEN_A_CORE
Pre-op note.   Patient evaluated.   PE: 4.5 cm/thick/posterior /-2.   AROM since 2.30 am- over 18 hours.   No cervical; changes in over 18 hours.   D/W Patient & partner- possibility of developing Chorioamnionitis.   Options discussed to continue with induction or  delivery.  R/B/A informed, all questions answered.   Possible complications associated with  delivery informed.   From excessive blood loss to injury to surrounding organs among other things.   Verbalized understanding & consented for  delivery.

## 2025-03-29 NOTE — OB PROVIDER LABOR PROGRESS NOTE - NS_SUBJECTIVE/OBJECTIVE_OBGYN_ALL_OB_FT
Patient examined and cervical balloon expelled.    T(C): 36.8 (03-28-25 @ 23:41), Max: 37.1 (03-28-25 @ 10:01)  HR: 98 (03-29-25 @ 02:30) (64 - 111)  BP: 136/90 (03-29-25 @ 02:27) (107/64 - 189/122)  RR: 18 (03-28-25 @ 21:30) (15 - 18)  SpO2: 94% (03-29-25 @ 02:30) (92% - 99%) PTA held for intermittent Cat 2 tracing with absent to minimal variability. Patient examined and cervical balloon expelled.    T(C): 36.8 (03-28-25 @ 23:41), Max: 37.1 (03-28-25 @ 10:01)  HR: 98 (03-29-25 @ 02:30) (64 - 111)  BP: 136/90 (03-29-25 @ 02:27) (107/64 - 189/122)  RR: 18 (03-28-25 @ 21:30) (15 - 18)  SpO2: 94% (03-29-25 @ 02:30) (92% - 99%)

## 2025-03-29 NOTE — OB PROVIDER LABOR PROGRESS NOTE - ASSESSMENT
25yo  @34+5 IOL for sPEC. Patient making appropriate cervical change    - SVE: /-2  - FHT Cat 2, minimal variability however moderate variability to scalp stim  - Will obtain stat HELLP labs and Mg level  - IUPC placed    d/w Dr. Kadeem Gray, PGY4

## 2025-03-29 NOTE — DISCHARGE NOTE OB - PATIENT PORTAL LINK FT
You can access the FollowMyHealth Patient Portal offered by [facility] by registering at the following website: [email or web address]. By joining [employer]’s FollowMyHealth portal, you will also be able to view your health information using other applications (apps) compatible with our system.

## 2025-03-29 NOTE — DISCHARGE NOTE OB - MEDICATION SUMMARY - MEDICATIONS TO TAKE
I will START or STAY ON the medications listed below when I get home from the hospital:    ibuprofen 100 mg/5 mL oral suspension  -- 30 milliliter(s) by mouth every 6 hours as needed for  moderate pain  -- Indication: For Pain    acetaminophen 160 mg/5 mL oral suspension  -- 30.47 milliliter(s) by mouth every 6 hours as needed for  mild pain  -- Indication: For Pain    ferrous sulfate 325 mg (65 mg elemental iron) oral tablet  -- 1 tab(s) by mouth 3 times a day  -- Indication: For Anemia    ascorbic acid 500 mg oral tablet  -- 1 tab(s) by mouth once a day  -- Indication: For Supplement   I will START or STAY ON the medications listed below when I get home from the hospital:    blood pressure monitor  -- Electronic Blood Pressure monitor x1.  Please check your blood pressure 3x/day. Notify your doctor for blood pressure readings 140/90 or greater. Return to hospital for blood pressures greater than 160/110  -- Indication: For blood pressure monitoring    labetalol 300 mg oral tablet  -- 2 tab(s) by mouth 3 times a day Please take your blood pressure before taking the medication. Please do not take the Labetalol if the blood pressure less than 110 or less than 60.  -- Indication: For Preeclampsia, severe    NIFEdipine 60 mg oral tablet, extended release  -- 1 tab(s) by mouth 2 times a day Please take your blood pressure before taking the medication. Please do not take the Procardia if the blood pressure less than 110 or less than 60.  -- Indication: For Preeclampsia, severe    ferrous sulfate 325 mg (65 mg elemental iron) oral tablet  -- 1 tab(s) by mouth 3 times a day  -- Indication: For Anemia    hydrALAZINE 50 mg oral tablet  -- 1 tab(s) by mouth every 8 hours Please take your blood pressure before taking the medication. Please do not take the Hydralazine if the blood pressure less than 110 or less than 60.  -- Indication: For Preeclampsia, severe

## 2025-03-29 NOTE — CHART NOTE - NSCHARTNOTEFT_GEN_A_CORE
ob attending    pt comfortable  ve 3-4/60/-2  fht 115 moderate variability no decels  toco q couplets 3-4 min  a/p sPEC 34w reassuring variability  bps controlled  continue to titrate pitocin  arom 2a  position w shabbir Rico MD

## 2025-03-29 NOTE — OB PROVIDER LABOR PROGRESS NOTE - NS_SUBJECTIVE/OBJECTIVE_OBGYN_ALL_OB_FT
IUPC placed to better titrate pitocin. Second PTA held for minimal variability.    T(C): 36.7 (03-29-25 @ 03:00), Max: 37.1 (03-28-25 @ 10:01)  HR: 98 (03-29-25 @ 03:29) (64 - 111)  BP: 121/75 (03-29-25 @ 03:27) (107/64 - 189/122)  RR: 16 (03-29-25 @ 03:00) (15 - 18)  SpO2: 96% (03-29-25 @ 03:29) (92% - 99%)

## 2025-03-29 NOTE — CHART NOTE - NSCHARTNOTEFT_GEN_A_CORE
Ob attending    pt comfortable  fht 115 moderate varaibility no decels  toco irreg  a/p cat 1 fht spec  magnesium  pitocin d/cd for 1 hour then will restart at 6mu  mag level now  d/w pt and  pit break for 1 hour- unable to achieve adequate ctx at any pitocin level  at 12 hours AROM with pitocin - pt declining  section at this time- will continue labor induction another 6 hours as bps controlled and fht cat 1    Jacky GOMES

## 2025-03-29 NOTE — OB RN INTRAOPERATIVE NOTE - NSSELHIDDEN_OBGYN_ALL_OB_FT
[NS_[email or web address]_ALL_OB_FT:[id number]],[NS_DeliveryAssist1_OBGYN_ALL_OB_FT:[id number]],[NS_DeliveryRN_OBGYN_ALL_OB_FT:[id number]=]

## 2025-03-29 NOTE — DISCHARGE NOTE OB - HOSPITAL COURSE
PCS- BOY PCS- BOY  sPEC, s/p Mag x24 hours. Rx Procardia XL 90mg QD and Labetalol 200mg BID. PCS- BOY  sPEC, s/p Mag x24 hours. Rx Procardia XL 60mg BID, Labetalol 600 TID, Hydralazine 50 TID.  Discharged home with close f/u with cardio OB and primary OB.

## 2025-03-29 NOTE — DISCHARGE NOTE OB - PLAN OF CARE
f/u 1 week Follow-up in your OB office within 1 week for a blood pressure check.  - your blood pressure monitor from [employer] @ [employer] (1st floor, back of [email or web address] shop)   Please take your blood pressure 3x/day. Call your doctor if your blood pressure is 140/90 or higher [140 (top number) OR 90 (bottom number)]. Return to hospital if your blood pressure is 160/110 or higher [160 (top number)  (bottom number)]. Call your doctor if you experience symptoms such as headache, blurry vision, epigastric pain, severe swelling, or nausea/vomiting. Follow up in the office in 2 weeks for an incision check and in 6 weeks for a postpartum visit.   Regular diet. Resume normal activity as tolerated.. No heavy lifting, driving, or strenuous activity for 2 weeks. Nothing per vagina such as tampons, intercourse, douches or tub baths for 6 weeks or until you see your doctor. Call your doctor with any signs and symptoms of infection such as fever, chills, nausea or vomiting. Call your doctor with redness or swelling at the incision site, fluid leakage or wound separation. Call your doctor if you're unable to tolerate food, increase in vaginal bleeding or have difficulty urinating. Call your doctor if you have pain that is not relieved by your prescribed medications. Notify your doctor with any other concerns. Follow-up in your OB office within 1 week for a blood pressure check.  - your blood pressure monitor from [employer] @ [employer] (1st floor, back of [employer] shop)   Please take your blood pressure 3x/day. Call your doctor if your blood pressure is 140/90 or higher [140 (top number) OR 90 (bottom number)]. Return to hospital if your blood pressure is 160/110 or higher [160 (top number)  (bottom number)]. Call your doctor if you experience symptoms such as headache, blurry vision, epigastric pain, severe swelling, or nausea/vomiting.    Continue taking the Procardia 60mg every 12 hours, Labetalol 600 every 8 hours, and Hydralazine 50mg every 8 hours.   Follow-up with Cardio OB Dr. Nella Leonard in 1 week  [phone]

## 2025-03-29 NOTE — DISCHARGE NOTE OB - PROVIDER TOKENS
PROVIDER:[TOKEN:[[id number]]] PROVIDER:[TOKEN:[[id number]]],FREE:[LAST:[Aisha],FIRST:[Nella],PHONE:[phone]],FAX:[(   )    -],FOLLOWUP:[1 week]]

## 2025-03-29 NOTE — OB PROVIDER LABOR PROGRESS NOTE - NS_OBIHIFHRDETAILS_OBGYN_ALL_OB_FT
Baseline 125, mod variability, + accels, - decels
Baseline 130, mod variability, + accels, - decels
Cat 2: 120/minimal variability/ - accels/ - decels
130/mod/+acel/s-decels
Cat 2: 120/min to mod variability/ - accels/ - decels
Cat 2: 120/minimal variabilit/ - accels/ - decels

## 2025-03-29 NOTE — OB PROVIDER LABOR PROGRESS NOTE - ASSESSMENT
25yo  @34+5 IOL for sPEC    - SVE: CRB still firmly in place however cervix is soft and dilating around balloon  - FHT Cat 2, overall reassuring. Periods of minimal variability likely from magnesium  - Mg running at 1.5g/hr  - HEL labs wnl    to be d/w Dr. Kadeem Gray, PGY4

## 2025-03-29 NOTE — DISCHARGE NOTE OB - CARE PLAN
1 Principal Discharge DX:	 delivery delivered  Assessment and plan of treatment:	f/u 1 week   Principal Discharge DX:	 delivery delivered  Assessment and plan of treatment:	Follow up in the office in 2 weeks for an incision check and in 6 weeks for a postpartum visit.   Regular diet. Resume normal activity as tolerated.. No heavy lifting, driving, or strenuous activity for 2 weeks. Nothing per vagina such as tampons, intercourse, douches or tub baths for 6 weeks or until you see your doctor. Call your doctor with any signs and symptoms of infection such as fever, chills, nausea or vomiting. Call your doctor with redness or swelling at the incision site, fluid leakage or wound separation. Call your doctor if you're unable to tolerate food, increase in vaginal bleeding or have difficulty urinating. Call your doctor if you have pain that is not relieved by your prescribed medications. Notify your doctor with any other concerns.  Secondary Diagnosis:	Severe preeclampsia  Assessment and plan of treatment:	Follow-up in your OB office within 1 week for a blood pressure check.  - your blood pressure monitor from [employer] @ [employer] (1st floor, back of [employer] shop)   Please take your blood pressure 3x/day. Call your doctor if your blood pressure is 140/90 or higher [140 (top number) OR 90 (bottom number)]. Return to hospital if your blood pressure is 160/110 or higher [160 (top number)  (bottom number)]. Call your doctor if you experience symptoms such as headache, blurry vision, epigastric pain, severe swelling, or nausea/vomiting.   Principal Discharge DX:	 delivery delivered  Assessment and plan of treatment:	Follow up in the office in 2 weeks for an incision check and in 6 weeks for a postpartum visit.   Regular diet. Resume normal activity as tolerated.. No heavy lifting, driving, or strenuous activity for 2 weeks. Nothing per vagina such as tampons, intercourse, douches or tub baths for 6 weeks or until you see your doctor. Call your doctor with any signs and symptoms of infection such as fever, chills, nausea or vomiting. Call your doctor with redness or swelling at the incision site, fluid leakage or wound separation. Call your doctor if you're unable to tolerate food, increase in vaginal bleeding or have difficulty urinating. Call your doctor if you have pain that is not relieved by your prescribed medications. Notify your doctor with any other concerns.  Secondary Diagnosis:	Severe preeclampsia  Assessment and plan of treatment:	Follow-up in your OB office within 1 week for a blood pressure check.  - your blood pressure monitor from [employer] @ [employer] (1st floor, back of [employer] shop)   Please take your blood pressure 3x/day. Call your doctor if your blood pressure is 140/90 or higher [140 (top number) OR 90 (bottom number)]. Return to hospital if your blood pressure is 160/110 or higher [160 (top number)  (bottom number)]. Call your doctor if you experience symptoms such as headache, blurry vision, epigastric pain, severe swelling, or nausea/vomiting.    Continue taking the Procardia 60mg every 12 hours, Labetalol 600 every 8 hours, and Hydralazine 50mg every 8 hours.   Follow-up with Cardio OB Dr. Nella Leonard in 1 week  [phone]

## 2025-03-29 NOTE — OB PROVIDER DELIVERY SUMMARY - NSSELHIDDEN_OBGYN_ALL_OB_FT
[NS_DeliveryAttending1_OBGYN_ALL_OB_FT:[id number]],[NS_DeliveryAssist1_OBGYN_ALL_OB_FT:[id number]]

## 2025-03-29 NOTE — CHART NOTE - NSCHARTNOTEFT_GEN_A_CORE
Discussed at safety rounds with MD Downey, MD Bellamy, Charge RN and primary RN     Pt undergoing IOL for SPEC at 32w5d. Blood pressures currently well controlled with   Pt with intermittent minimal variability, on magnesium.   Contraction pattern is dysfunction with coupling. IUPC in place. Contractions are not adequate. Discussed at safety rounds with MD Downey, MD Bellamy, Charge RN and primary RN     Pt undergoing IOL for SPEC at 32w5d. Blood pressures currently well controlled with Procardia 60mg XL   Pt with intermittent minimal variability, on magnesium. No decelerations noted.   Contraction pattern is dysfunction with coupling. IUPC in place. Contractions are not adequate.  Pitocin currently with a rate of 32mu   Epidural in place - working for pain control. Pt is able to mobilize in bed    Plan to decreased Pitocin to 16mu. New Pitocin bag hung.   Active repositioning to help facilitate fetal engagement and spontaneous contractions. Plan to rotate positions q30 min     Olivia Bedolla PGY 4

## 2025-03-29 NOTE — CHART NOTE - NSCHARTNOTEFT_GEN_A_CORE
R3 PTA Note    PTA  PTA was done with the team due to  Indication: CAT II (minimal variability)  Plan:  Maternal Resuscitation  IUPC to be placed to titrate pitocin  STAT Mg level/HELLP labs  Fetal scalp stim to be performed at time of IUPC placement  Will reevaluate in 1h    Dr. Ledezma, Dr. Carson, Dr. Quan, Joycelyn PGY4 present for PTA  VTimmel PGY3 R3 PTA Note    PTA  PTA was done with the team due to  Indication: CAT II (minimal variability)  Plan:  Maternal Resuscitation  IUPC to be placed to titrate pitocin  STAT Mg level/HELLP labs  Fetal scalp stim to be performed at time of IUPC placement  Will reevaluate in 1h    Dr. Ledezma, Dr. Carson, Dr. Quan, Joycelyn PGY4, charge RN, josephine RN present for PTA  VTimmel PGY3    Attending attestation:  Agree with findings and plan as documented with changes made as necessary.   23yo G1 at 34.5wga IOL for preE with severe features.   Category II tracing with persistent minimal variability to periods of absent variability, no decelerations  Will continue resuscitative measures as above and assess magnesium level to see if supratherapeutic (assess for pausing magnesium)  Plan otherwise as above  KASSIE Carson MD

## 2025-03-29 NOTE — OB PROVIDER LABOR PROGRESS NOTE - NS_SUBJECTIVE/OBJECTIVE_OBGYN_ALL_OB_FT
Patient re-examined for progress. CRB still in place although nearly expelled.    T(C): 36.8 (03-28-25 @ 23:41), Max: 37.1 (03-28-25 @ 10:01)  HR: 84 (03-29-25 @ 01:09) (64 - 111)  BP: 107/66 (03-29-25 @ 01:07) (107/66 - 189/122)  RR: 18 (03-28-25 @ 21:30) (15 - 18)  SpO2: 96% (03-29-25 @ 01:09) (92% - 99%)

## 2025-03-29 NOTE — OB RN DELIVERY SUMMARY - NS_LABORCHARACTER_OBGYN_ALL_OB
Induction of labor-AROM/Augmentation of labor/External electronic FM/Antibiotics in labor/Steroids in labor

## 2025-03-29 NOTE — OB RN INTRAOPERATIVE NOTE - NS_DELIVERYASSIST1_OBGYN_ALL_OB_FT
education, assessment and coordination fo care. Time-based billing (NON-critical care).     The necessity of the time spent during the encounter on this date of service was due to:     - Ordering, reviewing, and interpreting labs, testing, and imaging.  - Independently obtaining a review of systems and performing a physical exam  - Reviewing prior hospitalization and where necessary, outpatient records.  - Counselling and educating patient and family regarding interpretation of aforementioned items and plan of care. PEs  thrombus of the SVC port  AC with hep gtt - Ordering, reviewing, and/or interpreting labs, testing, and/or imaging  - Independently obtaining a review of systems and performing a physical exam  - Reviewing prior records and where necessary, outpatient records and/or consultant documentation  - Counselling and educating patient and/or family regarding interpretation of aforementioned items and plan of care - Ordering, reviewing, and/or interpreting labs, testing, and/or imaging  - Independently obtaining a review of systems and performing a physical exam  - Reviewing prior records and where necessary, outpatient records and/or consultant documentation  - Counselling and educating patient and/or family regarding interpretation of aforementioned items and plan of care Time-based billing (NON-critical care).     The necessity of the time spent during the encounter on this date of service was due to:     - Ordering, reviewing, and interpreting labs, testing, and imaging.  - Independently obtaining a review of systems and performing a physical exam  - Reviewing prior hospitalization and where necessary, outpatient records.  - Counselling and educating patient and family regarding interpretation of aforementioned items and plan of care. Mony Hawkins MD

## 2025-03-29 NOTE — DISCHARGE NOTE OB - CARE PROVIDER_API CALL
Apolinar Tomlinson  Obstetrics and Gynecology  [address] [facility], [address]  [place] [address]  Phone: [phone]  Fax: [phone]  Follow Up Time:    Apolinar Tomlinson  Obstetrics and Gynecology  [address] [place] [address], [address]  [place] [address]  Phone: [phone]  Fax: [phone]  Follow Up Time:     Nella Leonard  Phone: [phone]  Fax: (   )    -  Follow Up Time: 1 week

## 2025-03-29 NOTE — OB RN DELIVERY SUMMARY - NSSELHIDDEN_OBGYN_ALL_OB_FT
[NS_DeliveryAttending1_OBGYN_ALL_OB_FT:[id number]=],[NS_DeliveryAssist1_OBGYN_ALL_OB_FT:[id number]=],[NS_DeliveryRN_OBGYN_ALL_OB_FT:[id number]]

## 2025-03-29 NOTE — OB RN DELIVERY SUMMARY - NS_SEPSISRSKCALC_OBGYN_ALL_OB_FT
EOS calculated successfully. EOS Risk Factor: 0.5/1000 live births ([facility] national incidence); GA=34w5d; Temp=98.8; ROM=19.767; GBS='Unknown'; Antibiotics='GBS specific antibiotics > 2 hrs prior to birth'

## 2025-03-29 NOTE — OB PROVIDER LABOR PROGRESS NOTE - ASSESSMENT
23yo  @34+5 IOL for sPEC now s/p CRB and AROM with clear fluid    - SVE: 4.5/60/-3  - c/w pitocin. Currently at 14u  - [facility] Cat 2 overall reassuring, ctm    d/w Dr. Kadeem Gray, PGY4 23yo  @34+5 IOL for sPEC now s/p CRB and AROM with clear fluid    - SVE: 4.5/60/-3  - c/w pitocin. Currently at 14u  - [facility] Cat 2 overall reassuring, ctm  - Decrease to Mg of 1g/hr    d/w Dr. Kadeem Gray, PGY4 25yo  @34+5 IOL for sPEC now s/p CRB and AROM with clear fluid    - SVE: 4.5/60/-3  - c/w pitocin. Currently at 14u  - [facility] Cat 2 overall reassuring, ctm  - Decrease to Mg of 1g/hr    d/w Dr. Kadeem Gray, PGY4    Attending attestation:  Agree with findings and plan as documented. PTA held for category II tracing with minimal variability. Mag decreased to 1g/h given was upper level of therapeutic and to see if variability improves. Exam performed and balloon dc'ed, AROM performed and scalp stim with slighty improvement in FHTs. Will reassess in another hour    KASSIE Carson MD  23yo  @34+5 IOL for sPEC now s/p CRB and AROM with clear fluid    - SVE: 4.5/60/-3  - c/w pitocin. Currently at 14u  - [facility] Cat 2 overall reassuring, ctm  - Decrease to Mg of 1g/hr    d/w Dr. Kadeem Gray, PGY4    Attending attestation:  Agree with findings and plan as documented. PTA held for category II tracing with minimal variability. Present at PTA included myself, MAKI Yanez MD , MAKI Gray MD PGY4, TAE Anders MD PGY3, charge RN, primary RN. Mag decreased to 1g/h given was upper level of therapeutic and to see if variability improves. Exam performed and balloon dc'ed, AROM performed and scalp stim with slighty improvement in FHTs. Will reassess in another hour    KASSIE Carson MD

## 2025-03-29 NOTE — OB NEONATOLOGY/PEDIATRICIAN DELIVERY SUMMARY - NSPEDSNEONOTESA_OBGYN_ALL_OB_FT
NICU resus called to OR for infant of mother with severe pre-eclampia on magnesium with arrest of labor. 34.5 wk AGA male born via CS to a 25y/o  mother. Maternal medical/surgical/pregnancy history of severe preeclampsia, migraines and anxiety. Maternal labs include Blood Type A+ , HIV - , RPR NR , Rubella I , Hep B - , GBS unknown (received ampx7). ROM at 0224 on 3/29 with clear fluids (ROM hours: ~19H).  Baby emerged with poor tone and weak cry, was warmed, dried, suctioned, and stimulated with APGARS of 3/5/8. CPAP was started at 1 MOL and was continued for 3 minutes with max settings 5/100% for poor color. Mom plans to initiate breastfeeding, consents Hep B vaccine and declines circ.  Highest maternal temp: 36.9. EOS 0.42.     3/29  TOB 2210  BW 2040g    Physical Exam:  Gen: no acute distress  HEENT:  anterior fontanel open soft and flat, nondysmorphic facies, no cleft lip/palate, ears normal set, no ear pits or tags, nares clinically patent  Resp: Normal respiratory effort with intermittent tracheal tugging and retractions  Cardio: Present S1/S2, regular rate and rhythm, no murmurs  Abd: soft, non tender, non distended, umbilical cord with 3 vessels  Neuro: weak tone but improved, weak rosina, +plantar grasp  Extremities: negative rogers and ortolani maneuvers, moving all extremities, no clavicular crepitus or stepoff  Skin: pink, warm  Genitals: Normal male anatomy, testicles palpable in scrotum b/l, Loy 1, anus patent

## 2025-03-29 NOTE — OB PROVIDER DELIVERY SUMMARY - NSPROVIDERDELIVERYNOTE_OBGYN_ALL_OB_FT
pLTCS 2/2 failed IOL for sPEC at 34w5d    Viable male infant, 2040g, 3/5/8 APGARS, cephalic presentation. Grossly normal uterus, bilateral fallopian tubes and ovaries.    Hysterotomy was closed in one layer with 1 Vicryl. Peritoneum reapproximated with 2-0 chromic.Fascia closed with 0 Vicryl. Subcutaneous reapproximated with 2-0 plain gut. Subcuticular skin closure with 3-0 monocryl.    QBL: 476  IVF: 1500  UOP: 75

## 2025-03-29 NOTE — DISCHARGE NOTE OB - FINANCIAL ASSISTANCE
[facility] provides services at a reduced cost to those who are determined to be eligible through [facility]’s financial assistance program. Information regarding [facility]’s financial assistance program can be found by going to [email or web address] or by calling [phone].

## 2025-03-29 NOTE — OB RN DELIVERY SUMMARY - NS_GENERALBABYACOMMENTA_OBGYN_ALL_OB_FT
Rest today and tomorrow. Tylenol for headache. Antibiotic ointment to forehead abrasion. Call Dr. John Richter office for follow up appointment. Return to ER for any new, worsening, or change in condition.
STS and breastfeeding not done due to  going NICU

## 2025-03-30 LAB
ALBUMIN SERPL ELPH-MCNC: 2.3 G/DL — LOW (ref 3.3–5)
ALBUMIN SERPL ELPH-MCNC: 2.4 G/DL — LOW (ref 3.3–5)
ALP SERPL-CCNC: 152 U/L — HIGH (ref 40–120)
ALP SERPL-CCNC: 169 U/L — HIGH (ref 40–120)
ALT FLD-CCNC: 10 U/L — SIGNIFICANT CHANGE UP (ref 4–33)
ALT FLD-CCNC: 13 U/L — SIGNIFICANT CHANGE UP (ref 4–33)
ANION GAP SERPL CALC-SCNC: 13 MMOL/L — SIGNIFICANT CHANGE UP (ref 7–14)
ANION GAP SERPL CALC-SCNC: 9 MMOL/L — SIGNIFICANT CHANGE UP (ref 7–14)
AST SERPL-CCNC: 16 U/L — SIGNIFICANT CHANGE UP (ref 4–32)
AST SERPL-CCNC: 20 U/L — SIGNIFICANT CHANGE UP (ref 4–32)
BASOPHILS # BLD AUTO: 0.01 K/UL — SIGNIFICANT CHANGE UP (ref 0–0.2)
BASOPHILS # BLD AUTO: 0.02 K/UL — SIGNIFICANT CHANGE UP (ref 0–0.2)
BASOPHILS NFR BLD AUTO: 0.1 % — SIGNIFICANT CHANGE UP (ref 0–2)
BASOPHILS NFR BLD AUTO: 0.1 % — SIGNIFICANT CHANGE UP (ref 0–2)
BILIRUB SERPL-MCNC: <0.2 MG/DL — SIGNIFICANT CHANGE UP (ref 0.2–1.2)
BILIRUB SERPL-MCNC: <0.2 MG/DL — SIGNIFICANT CHANGE UP (ref 0.2–1.2)
BUN SERPL-MCNC: 20 MG/DL — SIGNIFICANT CHANGE UP (ref 7–23)
BUN SERPL-MCNC: 23 MG/DL — SIGNIFICANT CHANGE UP (ref 7–23)
CALCIUM SERPL-MCNC: 6.6 MG/DL — LOW (ref 8.4–10.5)
CALCIUM SERPL-MCNC: 6.9 MG/DL — LOW (ref 8.4–10.5)
CHLORIDE SERPL-SCNC: 104 MMOL/L — SIGNIFICANT CHANGE UP (ref 98–107)
CHLORIDE SERPL-SCNC: 108 MMOL/L — HIGH (ref 98–107)
CO2 SERPL-SCNC: 17 MMOL/L — LOW (ref 22–31)
CO2 SERPL-SCNC: 21 MMOL/L — LOW (ref 22–31)
CREAT SERPL-MCNC: 1.02 MG/DL — SIGNIFICANT CHANGE UP (ref 0.5–1.3)
CREAT SERPL-MCNC: 1.04 MG/DL — SIGNIFICANT CHANGE UP (ref 0.5–1.3)
CULTURE RESULTS: ABNORMAL
EGFR: 77 ML/MIN/1.73M2 — SIGNIFICANT CHANGE UP
EGFR: 77 ML/MIN/1.73M2 — SIGNIFICANT CHANGE UP
EGFR: 79 ML/MIN/1.73M2 — SIGNIFICANT CHANGE UP
EGFR: 79 ML/MIN/1.73M2 — SIGNIFICANT CHANGE UP
EOSINOPHIL # BLD AUTO: 0 K/UL — SIGNIFICANT CHANGE UP (ref 0–0.5)
EOSINOPHIL # BLD AUTO: 0 K/UL — SIGNIFICANT CHANGE UP (ref 0–0.5)
EOSINOPHIL NFR BLD AUTO: 0 % — SIGNIFICANT CHANGE UP (ref 0–6)
EOSINOPHIL NFR BLD AUTO: 0 % — SIGNIFICANT CHANGE UP (ref 0–6)
GLUCOSE SERPL-MCNC: 128 MG/DL — HIGH (ref 70–99)
GLUCOSE SERPL-MCNC: 144 MG/DL — HIGH (ref 70–99)
HCT VFR BLD CALC: 21.7 % — LOW (ref 34.5–45)
HCT VFR BLD CALC: 21.7 % — LOW (ref 34.5–45)
HCT VFR BLD CALC: 25.9 % — LOW (ref 34.5–45)
HGB BLD-MCNC: 7.1 G/DL — LOW (ref 11.5–15.5)
HGB BLD-MCNC: 7.1 G/DL — LOW (ref 11.5–15.5)
HGB BLD-MCNC: 8.5 G/DL — LOW (ref 11.5–15.5)
IANC: 14.99 K/UL — HIGH (ref 1.8–7.4)
IANC: 18.71 K/UL — HIGH (ref 1.8–7.4)
IMM GRANULOCYTES NFR BLD AUTO: 0.8 % — SIGNIFICANT CHANGE UP (ref 0–0.9)
IMM GRANULOCYTES NFR BLD AUTO: 0.8 % — SIGNIFICANT CHANGE UP (ref 0–0.9)
LDH SERPL L TO P-CCNC: 257 U/L — HIGH (ref 135–225)
LDH SERPL L TO P-CCNC: 291 U/L — HIGH (ref 135–225)
LYMPHOCYTES # BLD AUTO: 1.09 K/UL — SIGNIFICANT CHANGE UP (ref 1–3.3)
LYMPHOCYTES # BLD AUTO: 1.44 K/UL — SIGNIFICANT CHANGE UP (ref 1–3.3)
LYMPHOCYTES # BLD AUTO: 5 % — LOW (ref 13–44)
LYMPHOCYTES # BLD AUTO: 8 % — LOW (ref 13–44)
MAGNESIUM SERPL-MCNC: 5.6 MG/DL — HIGH (ref 1.6–2.6)
MAGNESIUM SERPL-MCNC: 6.3 MG/DL — HIGH (ref 1.6–2.6)
MAGNESIUM SERPL-MCNC: 6.6 MG/DL — HIGH (ref 1.6–2.6)
MCHC RBC-ENTMCNC: 28.4 PG — SIGNIFICANT CHANGE UP (ref 27–34)
MCHC RBC-ENTMCNC: 28.6 PG — SIGNIFICANT CHANGE UP (ref 27–34)
MCHC RBC-ENTMCNC: 28.6 PG — SIGNIFICANT CHANGE UP (ref 27–34)
MCHC RBC-ENTMCNC: 32.7 G/DL — SIGNIFICANT CHANGE UP (ref 32–36)
MCHC RBC-ENTMCNC: 32.7 G/DL — SIGNIFICANT CHANGE UP (ref 32–36)
MCHC RBC-ENTMCNC: 32.8 G/DL — SIGNIFICANT CHANGE UP (ref 32–36)
MCV RBC AUTO: 86.8 FL — SIGNIFICANT CHANGE UP (ref 80–100)
MCV RBC AUTO: 87.2 FL — SIGNIFICANT CHANGE UP (ref 80–100)
MCV RBC AUTO: 87.5 FL — SIGNIFICANT CHANGE UP (ref 80–100)
MONOCYTES # BLD AUTO: 1.48 K/UL — HIGH (ref 0–0.9)
MONOCYTES # BLD AUTO: 1.71 K/UL — HIGH (ref 0–0.9)
MONOCYTES NFR BLD AUTO: 7.9 % — SIGNIFICANT CHANGE UP (ref 2–14)
MONOCYTES NFR BLD AUTO: 8.2 % — SIGNIFICANT CHANGE UP (ref 2–14)
NEUTROPHILS # BLD AUTO: 14.99 K/UL — HIGH (ref 1.8–7.4)
NEUTROPHILS # BLD AUTO: 18.71 K/UL — HIGH (ref 1.8–7.4)
NEUTROPHILS NFR BLD AUTO: 82.9 % — HIGH (ref 43–77)
NEUTROPHILS NFR BLD AUTO: 86.2 % — HIGH (ref 43–77)
NRBC # BLD AUTO: 0 K/UL — SIGNIFICANT CHANGE UP (ref 0–0)
NRBC # FLD: 0 K/UL — SIGNIFICANT CHANGE UP (ref 0–0)
NRBC BLD AUTO-RTO: 0 /100 WBCS — SIGNIFICANT CHANGE UP (ref 0–0)
PLATELET # BLD AUTO: 217 K/UL — SIGNIFICANT CHANGE UP (ref 150–400)
PLATELET # BLD AUTO: 230 K/UL — SIGNIFICANT CHANGE UP (ref 150–400)
PLATELET # BLD AUTO: 260 K/UL — SIGNIFICANT CHANGE UP (ref 150–400)
POTASSIUM SERPL-MCNC: 4.1 MMOL/L — SIGNIFICANT CHANGE UP (ref 3.5–5.3)
POTASSIUM SERPL-MCNC: 4.3 MMOL/L — SIGNIFICANT CHANGE UP (ref 3.5–5.3)
POTASSIUM SERPL-SCNC: 4.1 MMOL/L — SIGNIFICANT CHANGE UP (ref 3.5–5.3)
POTASSIUM SERPL-SCNC: 4.3 MMOL/L — SIGNIFICANT CHANGE UP (ref 3.5–5.3)
PROT SERPL-MCNC: 4.2 G/DL — LOW (ref 6–8.3)
PROT SERPL-MCNC: 4.5 G/DL — LOW (ref 6–8.3)
RBC # BLD: 2.48 M/UL — LOW (ref 3.8–5.2)
RBC # BLD: 2.5 M/UL — LOW (ref 3.8–5.2)
RBC # BLD: 2.97 M/UL — LOW (ref 3.8–5.2)
RBC # FLD: 12.1 % — SIGNIFICANT CHANGE UP (ref 10.3–14.5)
RBC # FLD: 12.2 % — SIGNIFICANT CHANGE UP (ref 10.3–14.5)
RBC # FLD: 12.2 % — SIGNIFICANT CHANGE UP (ref 10.3–14.5)
SODIUM SERPL-SCNC: 134 MMOL/L — LOW (ref 135–145)
SODIUM SERPL-SCNC: 138 MMOL/L — SIGNIFICANT CHANGE UP (ref 135–145)
SPECIMEN SOURCE: SIGNIFICANT CHANGE UP
URATE SERPL-MCNC: 6.7 MG/DL — SIGNIFICANT CHANGE UP (ref 2.5–7)
URATE SERPL-MCNC: 6.9 MG/DL — SIGNIFICANT CHANGE UP (ref 2.5–7)
WBC # BLD: 18.07 K/UL — HIGH (ref 3.8–10.5)
WBC # BLD: 18.29 K/UL — HIGH (ref 3.8–10.5)
WBC # BLD: 21.7 K/UL — HIGH (ref 3.8–10.5)
WBC # FLD AUTO: 18.07 K/UL — HIGH (ref 3.8–10.5)
WBC # FLD AUTO: 18.29 K/UL — HIGH (ref 3.8–10.5)
WBC # FLD AUTO: 21.7 K/UL — HIGH (ref 3.8–10.5)

## 2025-03-30 RX ORDER — SENNA 187 MG
1 TABLET ORAL
Refills: 0 | Status: DISCONTINUED | OUTPATIENT
Start: 2025-03-30 | End: 2025-04-06

## 2025-03-30 RX ORDER — MAGNESIUM SULFATE 500 MG/ML
1 SYRINGE (ML) INJECTION
Qty: 40 | Refills: 0 | Status: DISCONTINUED | OUTPATIENT
Start: 2025-03-30 | End: 2025-04-01

## 2025-03-30 RX ORDER — ACETAMINOPHEN 500 MG/5ML
975 LIQUID (ML) ORAL
Refills: 0 | Status: DISCONTINUED | OUTPATIENT
Start: 2025-03-30 | End: 2025-03-30

## 2025-03-30 RX ORDER — ACETAMINOPHEN 500 MG/5ML
975 LIQUID (ML) ORAL EVERY 6 HOURS
Refills: 0 | Status: DISCONTINUED | OUTPATIENT
Start: 2025-03-30 | End: 2025-04-06

## 2025-03-30 RX ORDER — OXYCODONE HYDROCHLORIDE 30 MG/1
5 TABLET ORAL ONCE
Refills: 0 | Status: DISCONTINUED | OUTPATIENT
Start: 2025-03-30 | End: 2025-03-30

## 2025-03-30 RX ORDER — NIFEDIPINE 30 MG
60 TABLET, EXTENDED RELEASE 24 HR ORAL DAILY
Refills: 0 | Status: DISCONTINUED | OUTPATIENT
Start: 2025-03-30 | End: 2025-03-30

## 2025-03-30 RX ORDER — SODIUM CHLORIDE 9 G/1000ML
1000 INJECTION, SOLUTION INTRAVENOUS
Refills: 0 | Status: DISCONTINUED | OUTPATIENT
Start: 2025-03-30 | End: 2025-04-02

## 2025-03-30 RX ORDER — NIFEDIPINE 30 MG
90 TABLET, EXTENDED RELEASE 24 HR ORAL DAILY
Refills: 0 | Status: DISCONTINUED | OUTPATIENT
Start: 2025-03-31 | End: 2025-03-31

## 2025-03-30 RX ORDER — IBUPROFEN 200 MG
600 TABLET ORAL EVERY 6 HOURS
Refills: 0 | Status: DISCONTINUED | OUTPATIENT
Start: 2025-03-30 | End: 2025-04-06

## 2025-03-30 RX ORDER — MAGNESIUM SULFATE 500 MG/ML
2 SYRINGE (ML) INJECTION
Qty: 40 | Refills: 0 | Status: DISCONTINUED | OUTPATIENT
Start: 2025-03-30 | End: 2025-04-01

## 2025-03-30 RX ORDER — NIFEDIPINE 30 MG
30 TABLET, EXTENDED RELEASE 24 HR ORAL ONCE
Refills: 0 | Status: COMPLETED | OUTPATIENT
Start: 2025-03-30 | End: 2025-03-30

## 2025-03-30 RX ORDER — FERROUS SULFATE 137(45) MG
325 TABLET, EXTENDED RELEASE ORAL THREE TIMES A DAY
Refills: 0 | Status: DISCONTINUED | OUTPATIENT
Start: 2025-03-30 | End: 2025-04-06

## 2025-03-30 RX ADMIN — Medication 1000 MILLIGRAM(S): at 00:30

## 2025-03-30 RX ADMIN — KETOROLAC TROMETHAMINE 30 MILLIGRAM(S): 30 INJECTION, SOLUTION INTRAMUSCULAR; INTRAVENOUS at 04:03

## 2025-03-30 RX ADMIN — Medication 60 MILLIGRAM(S): at 12:29

## 2025-03-30 RX ADMIN — Medication 25 GM/HR: at 19:23

## 2025-03-30 RX ADMIN — Medication 25 GM/HR: at 07:24

## 2025-03-30 RX ADMIN — Medication 325 MILLIGRAM(S): at 14:20

## 2025-03-30 RX ADMIN — Medication 600 MILLIGRAM(S): at 21:17

## 2025-03-30 RX ADMIN — OXYCODONE HYDROCHLORIDE 5 MILLIGRAM(S): 30 TABLET ORAL at 23:45

## 2025-03-30 RX ADMIN — Medication 975 MILLIGRAM(S): at 18:32

## 2025-03-30 RX ADMIN — Medication 975 MILLIGRAM(S): at 19:02

## 2025-03-30 RX ADMIN — Medication 25 GM/HR: at 01:46

## 2025-03-30 RX ADMIN — Medication 400 MILLIGRAM(S): at 00:04

## 2025-03-30 RX ADMIN — OXYCODONE HYDROCHLORIDE 5 MILLIGRAM(S): 30 TABLET ORAL at 22:59

## 2025-03-30 RX ADMIN — Medication 600 MILLIGRAM(S): at 22:20

## 2025-03-30 RX ADMIN — Medication 500 MILLIGRAM(S): at 12:30

## 2025-03-30 RX ADMIN — Medication 30 MILLIGRAM(S): at 14:20

## 2025-03-30 RX ADMIN — Medication 325 MILLIGRAM(S): at 21:18

## 2025-03-30 RX ADMIN — KETOROLAC TROMETHAMINE 30 MILLIGRAM(S): 30 INJECTION, SOLUTION INTRAMUSCULAR; INTRAVENOUS at 05:05

## 2025-03-30 RX ADMIN — HEPARIN SODIUM 5000 UNIT(S): 1000 INJECTION INTRAVENOUS; SUBCUTANEOUS at 12:30

## 2025-03-30 NOTE — PROGRESS NOTE ADULT - ASSESSMENT
A/P: 23yo POD#1 s/p LTCS for failed IOL c/b sPEC.  Patient is stable and doing well post-operatively.      #Postop from LTCS  - Continue regular diet.  - Increase ambulation.  - Continue motrin, tylenol, oxycodone PRN for pain control.    - F/u AM CBC    #sPEC  -cw BP monitoring, BP: 132/89 (03-30-25 @ 04:09) (131/97 - 183/123)  -HELLP wnl, P/C: too high to calculate  -Pt on Pro60, Mg gtt for 24h PP  -No PEC symptoms A/P: 23yo POD#1 s/p LTCS for failed IOL c/b sPEC.  Patient is stable and doing well post-operatively.      #Postop from LTCS  - Continue regular diet.  - Increase ambulation.  - Continue motrin, tylenol, oxycodone PRN for pain control.    - F/u AM CBC    #sPEC  -cw BP monitoring, BP: 132/89 (03-30-25 @ 04:09) (131/97 - 183/123)  -HELLP wnl, P/C: too high to calculate  -Pt on Pro60  -On Mg gtt for 24h PP for seizure prophylaxis  -No PEC symptoms    Cira Fernández, PGY1

## 2025-03-30 NOTE — PROGRESS NOTE ADULT - SUBJECTIVE AND OBJECTIVE BOX
OB Progress Note:  Delivery, POD#1    S: 25yo POD#1 s/p LTCS . Her pain is well controlled. She is tolerating a regular diet and passing flatus. Denies N/V. Denies CP/SOB/lightheadedness/dizziness.   She is ambulating without difficulty.   Voiding spontaneously.     O:   Vital Signs Last 24 Hrs  T(C): 37.1 (30 Mar 2025 04:09), Max: 37.1 (30 Mar 2025 04:09)  T(F): 98.7 (30 Mar 2025 04:09), Max: 98.7 (30 Mar 2025 04:09)  HR: 92 (30 Mar 2025 04:09) (75 - 121)  BP: 132/89 (30 Mar 2025 04:09) (117/67 - 183/123)  BP(mean): 120 (30 Mar 2025 01:00) (108 - 122)  RR: 18 (30 Mar 2025 04:09) (14 - 19)  SpO2: 98% (30 Mar 2025 04:09) (91% - 100%)        Labs:  Blood type: A Positive  Rubella IgG: RPR: Negative                          11.5   18.84[H] >-----------< 279    (  @ 03:37 )             34.9                        11.3[L]   10.85[H] >-----------< 258    (  @ 10:12 )             33.7[L]    -25 @ 03:37      130[L]  |  99  |  21  ----------------------------<  147[H]  4.2   |  15[L]  |  0.96    -25 @ 10:12      137  |  105  |  19  ----------------------------<  83  4.1   |  21[L]  |  0.89        Ca    8.2[L]      29 Mar 2025 03:37  Ca    8.9      28 Mar 2025 10:12  Mg     6.40[H]     03-29  Mg     6.40[H]     03-29  Mg     6.70[H]     03-29  Mg     8.00[HH]     03-29  Mg     8.20[HH]       Mg     6.80[H]         TPro  6.3  /  Alb  3.2[L]  /  TBili  0.2  /  DBili  x   /  AST  23  /  ALT  13  /  AlkPhos  244[H]  25 @ 03:37  TPro  6.0  /  Alb  3.1[L]  /  TBili  <0.2  /  DBili  x   /  AST  20  /  ALT  12  /  AlkPhos  238[H]  25 @ 10:12          PE:  General: NAD  Abdomen: Mildly distended, appropriately tender, incision c/d/i.  Extremities: No erythema, no pitting edema     OB Progress Note:  Delivery, POD#1    S: 23yo POD#1 s/p pLTCS for failed IOL. Her pain is well controlled. She is tolerating a regular diet and passing flatus. Denies N/V. Denies CP/SOB/lightheadedness/dizziness. Denies headache, changes in vision, RUQ/epigastric pain/tenderness.   She is ambulating without difficulty.   Voiding spontaneously.     O:   Vital Signs Last 24 Hrs  T(C): 37.1 (30 Mar 2025 04:09), Max: 37.1 (30 Mar 2025 04:09)  T(F): 98.7 (30 Mar 2025 04:09), Max: 98.7 (30 Mar 2025 04:09)  HR: 92 (30 Mar 2025 04:09) (75 - 121)  BP: 132/89 (30 Mar 2025 04:09) (117/67 - 183/123)  BP(mean): 120 (30 Mar 2025 01:00) (108 - 122)  RR: 18 (30 Mar 2025 04:09) (14 - 19)  SpO2: 98% (30 Mar 2025 04:09) (91% - 100%)        Labs:  Blood type: A Positive  Rubella IgG: RPR: Negative                          11.5   18.84[H] >-----------< 279    (  @ 03:37 )             34.9                        11.3[L]   10.85[H] >-----------< 258    (  @ 10:12 )             33.7[L]    - @ 03:37      130[L]  |  99  |  21  ----------------------------<  147[H]  4.2   |  15[L]  |  0.96    25 @ 10:12      137  |  105  |  19  ----------------------------<  83  4.1   |  21[L]  |  0.89        Ca    8.2[L]      29 Mar 2025 03:37  Ca    8.9      28 Mar 2025 10:12  Mg     6.40[H]     03-29  Mg     6.40[H]       Mg     6.70[H]       Mg     8.00[HH]       Mg     8.20[HH]       Mg     6.80[H]         TPro  6.3  /  Alb  3.2[L]  /  TBili  0.2  /  DBili  x   /  AST  23  /  ALT  13  /  AlkPhos  244[H]  25 @ 03:37  TPro  6.0  /  Alb  3.1[L]  /  TBili  <0.2  /  DBili  x   /  AST  20  /  ALT  12  /  AlkPhos  238[H]  25 @ 10:12          PE:  General: NAD  Abdomen: Mildly distended, appropriately tender, incision c/d/i.  Extremities: No erythema, no pitting edema

## 2025-03-30 NOTE — PROGRESS NOTE ADULT - ATTENDING COMMENTS
25 Yo  POD #1 , sPEC , on magso4 1gm/hr   & Procardia XL 60  BP -130-140/   Plan   continue Mag for 2 4 hr   Fu  serum creatinine .   closely monitor  BP & urine output   FU labs 12 P

## 2025-03-31 LAB
ALBUMIN SERPL ELPH-MCNC: 2.5 G/DL — LOW (ref 3.3–5)
ALP SERPL-CCNC: 152 U/L — HIGH (ref 40–120)
ALT FLD-CCNC: 9 U/L — SIGNIFICANT CHANGE UP (ref 4–33)
ANION GAP SERPL CALC-SCNC: 13 MMOL/L — SIGNIFICANT CHANGE UP (ref 7–14)
AST SERPL-CCNC: 22 U/L — SIGNIFICANT CHANGE UP (ref 4–32)
BASOPHILS # BLD AUTO: 0.02 K/UL — SIGNIFICANT CHANGE UP (ref 0–0.2)
BASOPHILS NFR BLD AUTO: 0.1 % — SIGNIFICANT CHANGE UP (ref 0–2)
BILIRUB SERPL-MCNC: <0.2 MG/DL — SIGNIFICANT CHANGE UP (ref 0.2–1.2)
BUN SERPL-MCNC: 22 MG/DL — SIGNIFICANT CHANGE UP (ref 7–23)
CALCIUM SERPL-MCNC: 7 MG/DL — LOW (ref 8.4–10.5)
CHLORIDE SERPL-SCNC: 103 MMOL/L — SIGNIFICANT CHANGE UP (ref 98–107)
CO2 SERPL-SCNC: 20 MMOL/L — LOW (ref 22–31)
CREAT SERPL-MCNC: 0.88 MG/DL — SIGNIFICANT CHANGE UP (ref 0.5–1.3)
EGFR: 94 ML/MIN/1.73M2 — SIGNIFICANT CHANGE UP
EGFR: 94 ML/MIN/1.73M2 — SIGNIFICANT CHANGE UP
EOSINOPHIL # BLD AUTO: 0.01 K/UL — SIGNIFICANT CHANGE UP (ref 0–0.5)
EOSINOPHIL NFR BLD AUTO: 0.1 % — SIGNIFICANT CHANGE UP (ref 0–6)
GLUCOSE SERPL-MCNC: 85 MG/DL — SIGNIFICANT CHANGE UP (ref 70–99)
HCT VFR BLD CALC: 22.3 % — LOW (ref 34.5–45)
HGB BLD-MCNC: 7.1 G/DL — LOW (ref 11.5–15.5)
IANC: 13.68 K/UL — HIGH (ref 1.8–7.4)
IMM GRANULOCYTES NFR BLD AUTO: 0.9 % — SIGNIFICANT CHANGE UP (ref 0–0.9)
LDH SERPL L TO P-CCNC: 283 U/L — HIGH (ref 135–225)
LYMPHOCYTES # BLD AUTO: 12.9 % — LOW (ref 13–44)
LYMPHOCYTES # BLD AUTO: 2.25 K/UL — SIGNIFICANT CHANGE UP (ref 1–3.3)
MCHC RBC-ENTMCNC: 28.5 PG — SIGNIFICANT CHANGE UP (ref 27–34)
MCHC RBC-ENTMCNC: 31.8 G/DL — LOW (ref 32–36)
MCV RBC AUTO: 89.6 FL — SIGNIFICANT CHANGE UP (ref 80–100)
MONOCYTES # BLD AUTO: 1.28 K/UL — HIGH (ref 0–0.9)
MONOCYTES NFR BLD AUTO: 7.4 % — SIGNIFICANT CHANGE UP (ref 2–14)
NEUTROPHILS # BLD AUTO: 13.68 K/UL — HIGH (ref 1.8–7.4)
NEUTROPHILS NFR BLD AUTO: 78.6 % — HIGH (ref 43–77)
NRBC # BLD AUTO: 0 K/UL — SIGNIFICANT CHANGE UP (ref 0–0)
NRBC # FLD: 0 K/UL — SIGNIFICANT CHANGE UP (ref 0–0)
NRBC BLD AUTO-RTO: 0 /100 WBCS — SIGNIFICANT CHANGE UP (ref 0–0)
PLATELET # BLD AUTO: 246 K/UL — SIGNIFICANT CHANGE UP (ref 150–400)
POTASSIUM SERPL-MCNC: 4.4 MMOL/L — SIGNIFICANT CHANGE UP (ref 3.5–5.3)
POTASSIUM SERPL-SCNC: 4.4 MMOL/L — SIGNIFICANT CHANGE UP (ref 3.5–5.3)
PROT SERPL-MCNC: 4.7 G/DL — LOW (ref 6–8.3)
RBC # BLD: 2.49 M/UL — LOW (ref 3.8–5.2)
RBC # FLD: 12.3 % — SIGNIFICANT CHANGE UP (ref 10.3–14.5)
SODIUM SERPL-SCNC: 136 MMOL/L — SIGNIFICANT CHANGE UP (ref 135–145)
URATE SERPL-MCNC: 6.4 MG/DL — SIGNIFICANT CHANGE UP (ref 2.5–7)
WBC # BLD: 17.39 K/UL — HIGH (ref 3.8–10.5)
WBC # FLD AUTO: 17.39 K/UL — HIGH (ref 3.8–10.5)

## 2025-03-31 RX ORDER — NIFEDIPINE 30 MG
30 TABLET, EXTENDED RELEASE 24 HR ORAL DAILY
Refills: 0 | Status: DISCONTINUED | OUTPATIENT
Start: 2025-03-31 | End: 2025-04-04

## 2025-03-31 RX ORDER — LABETALOL HYDROCHLORIDE 200 MG/1
200 TABLET, FILM COATED ORAL EVERY 12 HOURS
Refills: 0 | Status: DISCONTINUED | OUTPATIENT
Start: 2025-03-31 | End: 2025-04-01

## 2025-03-31 RX ORDER — NIFEDIPINE 30 MG
30 TABLET, EXTENDED RELEASE 24 HR ORAL DAILY
Refills: 0 | Status: DISCONTINUED | OUTPATIENT
Start: 2025-03-31 | End: 2025-03-31

## 2025-03-31 RX ORDER — FERROUS SULFATE 137(45) MG
1 TABLET, EXTENDED RELEASE ORAL
Qty: 0 | Refills: 0 | DISCHARGE
Start: 2025-03-31

## 2025-03-31 RX ORDER — NIFEDIPINE 30 MG
60 TABLET, EXTENDED RELEASE 24 HR ORAL DAILY
Refills: 0 | Status: DISCONTINUED | OUTPATIENT
Start: 2025-03-31 | End: 2025-04-04

## 2025-03-31 RX ORDER — IBUPROFEN 200 MG
30 TABLET ORAL
Qty: 0 | Refills: 0 | DISCHARGE
Start: 2025-03-31

## 2025-03-31 RX ORDER — ACETAMINOPHEN 500 MG/5ML
30.47 LIQUID (ML) ORAL
Qty: 0 | Refills: 0 | DISCHARGE
Start: 2025-03-31

## 2025-03-31 RX ADMIN — Medication 325 MILLIGRAM(S): at 05:57

## 2025-03-31 RX ADMIN — Medication 325 MILLIGRAM(S): at 11:07

## 2025-03-31 RX ADMIN — Medication 600 MILLIGRAM(S): at 12:07

## 2025-03-31 RX ADMIN — LABETALOL HYDROCHLORIDE 200 MILLIGRAM(S): 200 TABLET, FILM COATED ORAL at 09:06

## 2025-03-31 RX ADMIN — Medication 600 MILLIGRAM(S): at 18:32

## 2025-03-31 RX ADMIN — Medication 600 MILLIGRAM(S): at 23:51

## 2025-03-31 RX ADMIN — Medication 600 MILLIGRAM(S): at 17:41

## 2025-03-31 RX ADMIN — Medication 325 MILLIGRAM(S): at 22:05

## 2025-03-31 RX ADMIN — Medication 975 MILLIGRAM(S): at 09:17

## 2025-03-31 RX ADMIN — Medication 600 MILLIGRAM(S): at 06:33

## 2025-03-31 RX ADMIN — HEPARIN SODIUM 5000 UNIT(S): 1000 INJECTION INTRAVENOUS; SUBCUTANEOUS at 05:55

## 2025-03-31 RX ADMIN — HEPARIN SODIUM 5000 UNIT(S): 1000 INJECTION INTRAVENOUS; SUBCUTANEOUS at 17:41

## 2025-03-31 RX ADMIN — Medication 60 MILLIGRAM(S): at 14:57

## 2025-03-31 RX ADMIN — Medication 975 MILLIGRAM(S): at 20:37

## 2025-03-31 RX ADMIN — Medication 600 MILLIGRAM(S): at 05:56

## 2025-03-31 RX ADMIN — Medication 975 MILLIGRAM(S): at 15:34

## 2025-03-31 RX ADMIN — Medication 30 MILLIGRAM(S): at 14:57

## 2025-03-31 RX ADMIN — Medication 975 MILLIGRAM(S): at 14:34

## 2025-03-31 RX ADMIN — Medication 975 MILLIGRAM(S): at 03:45

## 2025-03-31 RX ADMIN — Medication 975 MILLIGRAM(S): at 08:17

## 2025-03-31 RX ADMIN — Medication 975 MILLIGRAM(S): at 04:40

## 2025-03-31 RX ADMIN — Medication 600 MILLIGRAM(S): at 11:07

## 2025-03-31 RX ADMIN — LABETALOL HYDROCHLORIDE 200 MILLIGRAM(S): 200 TABLET, FILM COATED ORAL at 22:07

## 2025-03-31 RX ADMIN — Medication 500 MILLIGRAM(S): at 11:07

## 2025-03-31 RX ADMIN — Medication 975 MILLIGRAM(S): at 20:07

## 2025-03-31 NOTE — PROGRESS NOTE ADULT - SUBJECTIVE AND OBJECTIVE BOX
R1 Progress Note    Patient seen and examined at bedside, no acute overnight events. No acute complaints, pain well controlled. Patient is ambulating and tolerating regular diet. Pt is passing flatus and voiding spontaneosuly. Denies fever, chills, CP, SOB, N/V, HA, blurred vision.     Vital Signs Last 24 Hours  T(C): 36.6 (03-31-25 @ 01:42), Max: 36.9 (03-30-25 @ 14:00)  HR: 86 (03-31-25 @ 01:42) (81 - 129)  BP: 125/75 (03-31-25 @ 01:42) (125/75 - 161/105)  RR: 17 (03-31-25 @ 01:42) (17 - 18)  SpO2: 100% (03-31-25 @ 01:42) (97% - 100%)    I&O's Summary    29 Mar 2025 07:01  -  30 Mar 2025 07:00  --------------------------------------------------------  IN: 3675 mL / OUT: 2711 mL / NET: 964 mL    30 Mar 2025 07:01  -  31 Mar 2025 05:12  --------------------------------------------------------  IN: 1400 mL / OUT: 1995 mL / NET: -595 mL        Physical Exam:  General: NAD  Abdomen: Soft, appropriately-tender, mildly-distended, fundus firm  Incision: Pfannenstiel incision CDI, subcuticular suture closure  Pelvic: Lochia wnl    Labs:    Blood Type: A Positive  Antibody Screen: Negative  RPR: Negative               7.1    18.29 )-----------( 230      ( 03-30 @ 20:20 )             21.7                7.1    18.07 )-----------( 217      ( 03-30 @ 15:46 )             21.7                8.5    21.70 )-----------( 260      ( 03-30 @ 05:22 )             25.9         MEDICATIONS  (STANDING):  acetaminophen   Oral Liquid .. 975 milliGRAM(s) Oral every 6 hours  ascorbic acid 500 milliGRAM(s) Oral daily  diphtheria/tetanus/pertussis (acellular) Vaccine (Adacel) 0.5 milliLiter(s) IntraMuscular once  ferrous    sulfate 325 milliGRAM(s) Oral three times a day  heparin   Injectable 5000 Unit(s) SubCutaneous every 12 hours  ibuprofen  Suspension. 600 milliGRAM(s) Oral every 6 hours  lactated ringers. 1000 milliLiter(s) (125 mL/Hr) IV Continuous <Continuous>  lactated ringers. 1000 milliLiter(s) (75 mL/Hr) IV Continuous <Continuous>  magnesium sulfate Infusion 2 Gm/Hr (50 mL/Hr) IV Continuous <Continuous>  magnesium sulfate Infusion 1 Gm/Hr (25 mL/Hr) IV Continuous <Continuous>  morphine PF Epidural 2 milliGRAM(s) Epidural once  NIFEdipine XL 90 milliGRAM(s) Oral daily  oxytocin Infusion 42 milliUNIT(s)/Min (42 mL/Hr) IV Continuous <Continuous>    MEDICATIONS  (PRN):  dexAMETHasone  Injectable 4 milliGRAM(s) IV Push every 6 hours PRN Nausea  diphenhydrAMINE 25 milliGRAM(s) Oral every 6 hours PRN Pruritus  lanolin Ointment 1 Application(s) Topical every 6 hours PRN Sore Nipples  magnesium hydroxide Suspension 30 milliLiter(s) Oral two times a day PRN Constipation  nalbuphine Injectable 2.5 milliGRAM(s) IV Push every 6 hours PRN Pruritus  naloxone Injectable 0.1 milliGRAM(s) IV Push every 3 minutes PRN For ANY of the following changes in patient status:  A. Breaths Per Minute LESS THAN 10, B. Oxygen saturation LESS THAN 90%, C. Sedation score of 6 for Stop After: 4 Times  ondansetron Injectable 4 milliGRAM(s) IV Push every 6 hours PRN Nausea  oxyCODONE    IR 5 milliGRAM(s) Oral once PRN Moderate to Severe Pain (4-10)  oxyCODONE    IR 5 milliGRAM(s) Oral every 3 hours PRN Moderate to Severe Pain (4-10)  oxyCODONE    IR 5 milliGRAM(s) Oral once PRN Moderate to Severe Pain (4-10)  senna 1 Tablet(s) Oral two times a day PRN Constipation  simethicone 80 milliGRAM(s) Chew every 4 hours PRN Gas

## 2025-03-31 NOTE — PROGRESS NOTE ADULT - SUBJECTIVE AND OBJECTIVE BOX
Postpartum Note,  Section  She is a  24y woman who is now post-operative day: 2    Subjective:  The patient feels well.  She is ambulating.   She is tolerating regular diet.  She denies nausea and vomiting.  She is voiding.  Her pain is controlled.  She reports normal postpartum bleeding.        Physical exam:    Vital Signs Last 24 Hrs  T(C): 36.7 (31 Mar 2025 05:30), Max: 36.9 (30 Mar 2025 14:00)  T(F): 98 (31 Mar 2025 05:30), Max: 98.4 (30 Mar 2025 14:00)  HR: 110 (31 Mar 2025 08:53) (81 - 129)  BP: 155/93 (31 Mar 2025 08:53) (125/75 - 161/105)  BP(mean): --  RR: 22 (31 Mar 2025 08:53) (17 - 22)  SpO2: 98% (31 Mar 2025 05:30) (97% - 100%)    Parameters below as of 31 Mar 2025 08:53  Patient On (Oxygen Delivery Method): room air        Gen: NAD  Breast: Soft, nontender, not engorged.  Abdomen: Soft, nontender, no distension , firm uterine fundus at umbilicus.  Incision: Clean, dry, and intact   Pelvic: Normal lochia noted  Ext: No calf tenderness    LABS:                        7.1    17.39 )-----------( 246      ( 31 Mar 2025 05:00 )             22.3                         7.1    18.29 )-----------( 230      ( 30 Mar 2025 20:20 )             21.7                         7.1    18.07 )-----------( 217      ( 30 Mar 2025 15:46 )             21.7                         8.5    21.70 )-----------( 260      ( 30 Mar 2025 05:22 )             25.9     Magnesium: 6.30 mg/dL ( @ 18:45)  Magnesium: 6.60 mg/dL ( @ 12:15)    25 @ 05:00      136  |  103  |  22  ----------------------------<  85  4.4   |  20[L]  |  0.88    25 @ 15:46      134[L]  |  104  |  23  ----------------------------<  128[H]  4.3   |  21[L]  |  1.04    25 @ 05:22      138  |  108[H]  |  20  ----------------------------<  144[H]  4.1   |  17[L]  |  1.02    25 @ 03:37      130[L]  |  99  |  21  ----------------------------<  147[H]  4.2   |  15[L]  |  0.96    25 @ 10:12      137  |  105  |  19  ----------------------------<  83  4.1   |  21[L]  |  0.89        Ca    7.0[L]      31 Mar 2025 05:00  Ca    6.6[L]      30 Mar 2025 15:46  Ca    6.9[L]      30 Mar 2025 05:22  Ca    8.2[L]      29 Mar 2025 03:37  Ca    8.9      28 Mar 2025 10:12  Mg     6.30[H]     03-30  Mg     6.60[H]     03-30  Mg     5.60[H]     03-30  Mg     6.40[H]     03-29  Mg     6.40[H]     03-29  Mg     6.70[H]     03-29  Mg     8.00[HH]     03-29  Mg     8.20[HH]     03-28  Mg     6.80[H]     03-28    TPro  4.7[L]  /  Alb  2.5[L]  /  TBili  <0.2  /  DBili  x   /  AST  22  /  ALT  9   /  AlkPhos  152[H]  25 @ 05:00  TPro  4.2[L]  /  Alb  2.3[L]  /  TBili  <0.2  /  DBili  x   /  AST  16  /  ALT  10  /  AlkPhos  152[H]  25 @ 15:46  TPro  4.5[L]  /  Alb  2.4[L]  /  TBili  <0.2  /  DBili  x   /  AST  20  /  ALT  13  /  AlkPhos  169[H]  25 @ 05:22  TPro  6.3  /  Alb  3.2[L]  /  TBili  0.2  /  DBili  x   /  AST  23  /  ALT  13  /  AlkPhos  244[H]  25 @ 03:37  TPro  6.0  /  Alb  3.1[L]  /  TBili  <0.2  /  DBili  x   /  AST  20  /  ALT  12  /  AlkPhos  238[H]  25 @ 10:12        Allergies    No Known Allergies    Intolerances      MEDICATIONS  (STANDING):  acetaminophen   Oral Liquid .. 975 milliGRAM(s) Oral every 6 hours  ascorbic acid 500 milliGRAM(s) Oral daily  diphtheria/tetanus/pertussis (acellular) Vaccine (Adacel) 0.5 milliLiter(s) IntraMuscular once  ferrous    sulfate 325 milliGRAM(s) Oral three times a day  heparin   Injectable 5000 Unit(s) SubCutaneous every 12 hours  ibuprofen  Suspension. 600 milliGRAM(s) Oral every 6 hours  labetalol 200 milliGRAM(s) Oral every 12 hours  lactated ringers. 1000 milliLiter(s) (125 mL/Hr) IV Continuous <Continuous>  lactated ringers. 1000 milliLiter(s) (75 mL/Hr) IV Continuous <Continuous>  magnesium sulfate Infusion 2 Gm/Hr (50 mL/Hr) IV Continuous <Continuous>  magnesium sulfate Infusion 1 Gm/Hr (25 mL/Hr) IV Continuous <Continuous>  morphine PF Epidural 2 milliGRAM(s) Epidural once  NIFEdipine XL 90 milliGRAM(s) Oral daily  oxytocin Infusion 42 milliUNIT(s)/Min (42 mL/Hr) IV Continuous <Continuous>    MEDICATIONS  (PRN):  dexAMETHasone  Injectable 4 milliGRAM(s) IV Push every 6 hours PRN Nausea  diphenhydrAMINE 25 milliGRAM(s) Oral every 6 hours PRN Pruritus  lanolin Ointment 1 Application(s) Topical every 6 hours PRN Sore Nipples  magnesium hydroxide Suspension 30 milliLiter(s) Oral two times a day PRN Constipation  nalbuphine Injectable 2.5 milliGRAM(s) IV Push every 6 hours PRN Pruritus  naloxone Injectable 0.1 milliGRAM(s) IV Push every 3 minutes PRN For ANY of the following changes in patient status:  A. Breaths Per Minute LESS THAN 10, B. Oxygen saturation LESS THAN 90%, C. Sedation score of 6 for Stop After: 4 Times  ondansetron Injectable 4 milliGRAM(s) IV Push every 6 hours PRN Nausea  oxyCODONE    IR 5 milliGRAM(s) Oral once PRN Moderate to Severe Pain (4-10)  oxyCODONE    IR 5 milliGRAM(s) Oral every 3 hours PRN Moderate to Severe Pain (4-10)  oxyCODONE    IR 5 milliGRAM(s) Oral once PRN Moderate to Severe Pain (4-10)  senna 1 Tablet(s) Oral two times a day PRN Constipation  simethicone 80 milliGRAM(s) Chew every 4 hours PRN Gas        Assessment and Plan  POD #2 s/p  section sPEC labile bps  Doing well.  Encourage ambulation.  add labetalol 200mg PO BID starting now with elevated bps overnight and am-- with elevated HR chose beta blocker  recheck bp 1 hr and allow to NICU if stable    Jacky GOMES

## 2025-03-31 NOTE — PROGRESS NOTE ADULT - ASSESSMENT
25y/o POD#2from pLTCS. PMHx by sPEC. Pt anmol wheat.    #sPEC  - sp Mg  - cw Pro60  - cont to monitor pressures/ symptoms     #Postpartum  - Continue with po analgesia  - Increase ambulation  - Continue regular diet  - Incision site c/d/i    Sydney Bower PGY1

## 2025-04-01 ENCOUNTER — APPOINTMENT (OUTPATIENT)
Dept: OBGYN | Facility: CLINIC | Age: 25
End: 2025-04-01

## 2025-04-01 LAB
ALBUMIN SERPL ELPH-MCNC: 2.7 G/DL — LOW (ref 3.3–5)
ALP SERPL-CCNC: 145 U/L — HIGH (ref 40–120)
ALT FLD-CCNC: 20 U/L — SIGNIFICANT CHANGE UP (ref 4–33)
ANION GAP SERPL CALC-SCNC: 11 MMOL/L — SIGNIFICANT CHANGE UP (ref 7–14)
AST SERPL-CCNC: 40 U/L — HIGH (ref 4–32)
BASOPHILS # BLD AUTO: 0.04 K/UL — SIGNIFICANT CHANGE UP (ref 0–0.2)
BASOPHILS NFR BLD AUTO: 0.3 % — SIGNIFICANT CHANGE UP (ref 0–2)
BILIRUB SERPL-MCNC: <0.2 MG/DL — SIGNIFICANT CHANGE UP (ref 0.2–1.2)
BLD GP AB SCN SERPL QL: NEGATIVE — SIGNIFICANT CHANGE UP
BUN SERPL-MCNC: 12 MG/DL — SIGNIFICANT CHANGE UP (ref 7–23)
CALCIUM SERPL-MCNC: 8.1 MG/DL — LOW (ref 8.4–10.5)
CHLORIDE SERPL-SCNC: 104 MMOL/L — SIGNIFICANT CHANGE UP (ref 98–107)
CO2 SERPL-SCNC: 21 MMOL/L — LOW (ref 22–31)
CREAT SERPL-MCNC: 0.64 MG/DL — SIGNIFICANT CHANGE UP (ref 0.5–1.3)
EGFR: 126 ML/MIN/1.73M2 — SIGNIFICANT CHANGE UP
EGFR: 126 ML/MIN/1.73M2 — SIGNIFICANT CHANGE UP
EOSINOPHIL # BLD AUTO: 0.14 K/UL — SIGNIFICANT CHANGE UP (ref 0–0.5)
EOSINOPHIL NFR BLD AUTO: 1 % — SIGNIFICANT CHANGE UP (ref 0–6)
GLUCOSE SERPL-MCNC: 82 MG/DL — SIGNIFICANT CHANGE UP (ref 70–99)
HCT VFR BLD CALC: 19.3 % — CRITICAL LOW (ref 34.5–45)
HGB BLD-MCNC: 6.3 G/DL — CRITICAL LOW (ref 11.5–15.5)
IANC: 10.88 K/UL — HIGH (ref 1.8–7.4)
IMM GRANULOCYTES NFR BLD AUTO: 1.6 % — HIGH (ref 0–0.9)
LDH SERPL L TO P-CCNC: 420 U/L — HIGH (ref 135–225)
LYMPHOCYTES # BLD AUTO: 14.1 % — SIGNIFICANT CHANGE UP (ref 13–44)
LYMPHOCYTES # BLD AUTO: 2.01 K/UL — SIGNIFICANT CHANGE UP (ref 1–3.3)
MCHC RBC-ENTMCNC: 28.9 PG — SIGNIFICANT CHANGE UP (ref 27–34)
MCHC RBC-ENTMCNC: 32.6 G/DL — SIGNIFICANT CHANGE UP (ref 32–36)
MCV RBC AUTO: 88.5 FL — SIGNIFICANT CHANGE UP (ref 80–100)
MONOCYTES # BLD AUTO: 0.96 K/UL — HIGH (ref 0–0.9)
MONOCYTES NFR BLD AUTO: 6.7 % — SIGNIFICANT CHANGE UP (ref 2–14)
NEUTROPHILS # BLD AUTO: 10.88 K/UL — HIGH (ref 1.8–7.4)
NEUTROPHILS NFR BLD AUTO: 76.3 % — SIGNIFICANT CHANGE UP (ref 43–77)
NRBC # BLD AUTO: 0.05 K/UL — HIGH (ref 0–0)
NRBC # FLD: 0.05 K/UL — HIGH (ref 0–0)
NRBC BLD AUTO-RTO: 0 /100 WBCS — SIGNIFICANT CHANGE UP (ref 0–0)
PLATELET # BLD AUTO: 195 K/UL — SIGNIFICANT CHANGE UP (ref 150–400)
POTASSIUM SERPL-MCNC: 5 MMOL/L — SIGNIFICANT CHANGE UP (ref 3.5–5.3)
POTASSIUM SERPL-SCNC: 5 MMOL/L — SIGNIFICANT CHANGE UP (ref 3.5–5.3)
PROT SERPL-MCNC: 5.2 G/DL — LOW (ref 6–8.3)
RBC # BLD: 2.18 M/UL — LOW (ref 3.8–5.2)
RBC # FLD: 12.5 % — SIGNIFICANT CHANGE UP (ref 10.3–14.5)
RH IG SCN BLD-IMP: POSITIVE — SIGNIFICANT CHANGE UP
SODIUM SERPL-SCNC: 136 MMOL/L — SIGNIFICANT CHANGE UP (ref 135–145)
URATE SERPL-MCNC: 4.7 MG/DL — SIGNIFICANT CHANGE UP (ref 2.5–7)
WBC # BLD: 14.26 K/UL — HIGH (ref 3.8–10.5)
WBC # FLD AUTO: 14.26 K/UL — HIGH (ref 3.8–10.5)

## 2025-04-01 PROCEDURE — 93010 ELECTROCARDIOGRAM REPORT: CPT

## 2025-04-01 RX ORDER — NIFEDIPINE 30 MG
10 TABLET, EXTENDED RELEASE 24 HR ORAL ONCE
Refills: 0 | Status: COMPLETED | OUTPATIENT
Start: 2025-04-01 | End: 2025-04-01

## 2025-04-01 RX ORDER — CLOSTRIDIUM TETANI TOXOID ANTIGEN (FORMALDEHYDE INACTIVATED), CORYNEBACTERIUM DIPHTHERIAE TOXOID ANTIGEN (FORMALDEHYDE INACTIVATED), BORDETELLA PERTUSSIS TOXOID ANTIGEN (GLUTARALDEHYDE INACTIVATED), BORDETELLA PERTUSSIS FILAMENTOUS HEMAGGLUTININ ANTIGEN (FORMALDEHYDE INACTIVATED), BORDETELLA PERTUSSIS PERTACTIN ANTIGEN, AND BORDETELLA PERTUSSIS FIMBRIAE 2/3 ANTIGEN 5; 2; 2.5; 5; 3; 5 [LF]/.5ML; [LF]/.5ML; UG/.5ML; UG/.5ML; UG/.5ML; UG/.5ML
0.5 INJECTION, SUSPENSION INTRAMUSCULAR ONCE
Refills: 0 | Status: COMPLETED | OUTPATIENT
Start: 2025-04-01 | End: 2025-04-01

## 2025-04-01 RX ORDER — LABETALOL HYDROCHLORIDE 200 MG/1
20 TABLET, FILM COATED ORAL ONCE
Refills: 0 | Status: COMPLETED | OUTPATIENT
Start: 2025-04-01 | End: 2025-04-01

## 2025-04-01 RX ORDER — DIPHENHYDRAMINE HCL 12.5MG/5ML
25 ELIXIR ORAL ONCE
Refills: 0 | Status: COMPLETED | OUTPATIENT
Start: 2025-04-01 | End: 2025-04-01

## 2025-04-01 RX ORDER — LABETALOL HYDROCHLORIDE 200 MG/1
400 TABLET, FILM COATED ORAL THREE TIMES A DAY
Refills: 0 | Status: DISCONTINUED | OUTPATIENT
Start: 2025-04-01 | End: 2025-04-01

## 2025-04-01 RX ORDER — LABETALOL HYDROCHLORIDE 200 MG/1
40 TABLET, FILM COATED ORAL ONCE
Refills: 0 | Status: COMPLETED | OUTPATIENT
Start: 2025-04-01 | End: 2025-04-01

## 2025-04-01 RX ORDER — LABETALOL HYDROCHLORIDE 200 MG/1
200 TABLET, FILM COATED ORAL EVERY 8 HOURS
Refills: 0 | Status: DISCONTINUED | OUTPATIENT
Start: 2025-04-01 | End: 2025-04-01

## 2025-04-01 RX ORDER — LABETALOL HYDROCHLORIDE 200 MG/1
400 TABLET, FILM COATED ORAL EVERY 8 HOURS
Refills: 0 | Status: DISCONTINUED | OUTPATIENT
Start: 2025-04-01 | End: 2025-04-03

## 2025-04-01 RX ADMIN — Medication 975 MILLIGRAM(S): at 23:22

## 2025-04-01 RX ADMIN — LABETALOL HYDROCHLORIDE 20 MILLIGRAM(S): 200 TABLET, FILM COATED ORAL at 14:36

## 2025-04-01 RX ADMIN — Medication 25 MILLIGRAM(S): at 22:52

## 2025-04-01 RX ADMIN — Medication 60 MILLIGRAM(S): at 14:07

## 2025-04-01 RX ADMIN — Medication 975 MILLIGRAM(S): at 14:41

## 2025-04-01 RX ADMIN — LABETALOL HYDROCHLORIDE 20 MILLIGRAM(S): 200 TABLET, FILM COATED ORAL at 09:59

## 2025-04-01 RX ADMIN — HEPARIN SODIUM 5000 UNIT(S): 1000 INJECTION INTRAVENOUS; SUBCUTANEOUS at 06:13

## 2025-04-01 RX ADMIN — LABETALOL HYDROCHLORIDE 400 MILLIGRAM(S): 200 TABLET, FILM COATED ORAL at 23:54

## 2025-04-01 RX ADMIN — Medication 975 MILLIGRAM(S): at 02:39

## 2025-04-01 RX ADMIN — LABETALOL HYDROCHLORIDE 40 MILLIGRAM(S): 200 TABLET, FILM COATED ORAL at 15:05

## 2025-04-01 RX ADMIN — Medication 600 MILLIGRAM(S): at 19:37

## 2025-04-01 RX ADMIN — Medication 10 MILLIGRAM(S): at 17:50

## 2025-04-01 RX ADMIN — Medication 600 MILLIGRAM(S): at 11:39

## 2025-04-01 RX ADMIN — Medication 600 MILLIGRAM(S): at 06:30

## 2025-04-01 RX ADMIN — Medication 600 MILLIGRAM(S): at 12:35

## 2025-04-01 RX ADMIN — Medication 500 MILLIGRAM(S): at 11:39

## 2025-04-01 RX ADMIN — Medication 600 MILLIGRAM(S): at 00:21

## 2025-04-01 RX ADMIN — Medication 975 MILLIGRAM(S): at 10:33

## 2025-04-01 RX ADMIN — LABETALOL HYDROCHLORIDE 400 MILLIGRAM(S): 200 TABLET, FILM COATED ORAL at 16:00

## 2025-04-01 RX ADMIN — Medication 975 MILLIGRAM(S): at 14:07

## 2025-04-01 RX ADMIN — Medication 325 MILLIGRAM(S): at 11:38

## 2025-04-01 RX ADMIN — LABETALOL HYDROCHLORIDE 200 MILLIGRAM(S): 200 TABLET, FILM COATED ORAL at 09:36

## 2025-04-01 RX ADMIN — Medication 975 MILLIGRAM(S): at 22:52

## 2025-04-01 RX ADMIN — HEPARIN SODIUM 5000 UNIT(S): 1000 INJECTION INTRAVENOUS; SUBCUTANEOUS at 19:04

## 2025-04-01 RX ADMIN — Medication 600 MILLIGRAM(S): at 05:58

## 2025-04-01 RX ADMIN — Medication 975 MILLIGRAM(S): at 09:41

## 2025-04-01 RX ADMIN — Medication 325 MILLIGRAM(S): at 06:13

## 2025-04-01 RX ADMIN — Medication 325 MILLIGRAM(S): at 23:54

## 2025-04-01 RX ADMIN — Medication 30 MILLIGRAM(S): at 14:07

## 2025-04-01 RX ADMIN — Medication 975 MILLIGRAM(S): at 02:09

## 2025-04-01 RX ADMIN — Medication 600 MILLIGRAM(S): at 19:04

## 2025-04-01 RX ADMIN — CLOSTRIDIUM TETANI TOXOID ANTIGEN (FORMALDEHYDE INACTIVATED), CORYNEBACTERIUM DIPHTHERIAE TOXOID ANTIGEN (FORMALDEHYDE INACTIVATED), BORDETELLA PERTUSSIS TOXOID ANTIGEN (GLUTARALDEHYDE INACTIVATED), BORDETELLA PERTUSSIS FILAMENTOUS HEMAGGLUTININ ANTIGEN (FORMALDEHYDE INACTIVATED), BORDETELLA PERTUSSIS PERTACTIN ANTIGEN, AND BORDETELLA PERTUSSIS FIMBRIAE 2/3 ANTIGEN 0.5 MILLILITER(S): 5; 2; 2.5; 5; 3; 5 INJECTION, SUSPENSION INTRAMUSCULAR at 09:36

## 2025-04-01 NOTE — PROGRESS NOTE ADULT - SUBJECTIVE AND OBJECTIVE BOX
R1 Progress Note    Patient seen and examined at bedside, no acute overnight events. No acute complaints, pain well controlled. Patient is ambulating and tolerating regular diet. Pt is passing flatus and voiding spontaneously. Denies fever, chills, CP, SOB, N/V, HA, blurred vision.     Vital Signs Last 24 Hours  T(C): 36.3 (04-01-25 @ 01:53), Max: 37.2 (03-31-25 @ 18:12)  HR: 107 (04-01-25 @ 01:53) (96 - 122)  BP: 132/81 (04-01-25 @ 01:53) (124/85 - 155/93)  RR: 18 (04-01-25 @ 01:53) (17 - 22)  SpO2: 97% (04-01-25 @ 01:53) (97% - 100%)    I&O's Summary    30 Mar 2025 07:01  -  31 Mar 2025 07:00  --------------------------------------------------------  IN: 1400 mL / OUT: 1995 mL / NET: -595 mL    31 Mar 2025 07:01  -  01 Apr 2025 05:25  --------------------------------------------------------  IN: 0 mL / OUT: 500 mL / NET: -500 mL        Physical Exam:  General: NAD  Abdomen: Soft, appropriately-tender, non-distended, fundus firm  Incision: Pfannenstiel incision CDI, subcuticular suture closure  Pelvic: Lochia wnl    Labs:    Blood Type: A Positive  Antibody Screen: Negative  RPR: Negative               7.1    17.39 )-----------( 246      ( 03-31 @ 05:00 )             22.3                7.1    18.29 )-----------( 230      ( 03-30 @ 20:20 )             21.7                7.1    18.07 )-----------( 217      ( 03-30 @ 15:46 )             21.7         MEDICATIONS  (STANDING):  acetaminophen   Oral Liquid .. 975 milliGRAM(s) Oral every 6 hours  ascorbic acid 500 milliGRAM(s) Oral daily  diphtheria/tetanus/pertussis (acellular) Vaccine (Adacel) 0.5 milliLiter(s) IntraMuscular once  ferrous    sulfate 325 milliGRAM(s) Oral three times a day  heparin   Injectable 5000 Unit(s) SubCutaneous every 12 hours  ibuprofen  Suspension. 600 milliGRAM(s) Oral every 6 hours  labetalol 200 milliGRAM(s) Oral every 12 hours  lactated ringers. 1000 milliLiter(s) (125 mL/Hr) IV Continuous <Continuous>  lactated ringers. 1000 milliLiter(s) (75 mL/Hr) IV Continuous <Continuous>  NIFEdipine XL 60 milliGRAM(s) Oral daily  NIFEdipine XL 30 milliGRAM(s) Oral daily    MEDICATIONS  (PRN):  diphenhydrAMINE 25 milliGRAM(s) Oral every 6 hours PRN Pruritus  lanolin Ointment 1 Application(s) Topical every 6 hours PRN Sore Nipples  magnesium hydroxide Suspension 30 milliLiter(s) Oral two times a day PRN Constipation  oxyCODONE    IR 5 milliGRAM(s) Oral once PRN Moderate to Severe Pain (4-10)  oxyCODONE    IR 5 milliGRAM(s) Oral every 3 hours PRN Moderate to Severe Pain (4-10)  oxyCODONE    IR 5 milliGRAM(s) Oral once PRN Moderate to Severe Pain (4-10)  senna 1 Tablet(s) Oral two times a day PRN Constipation  simethicone 80 milliGRAM(s) Chew every 4 hours PRN Gas

## 2025-04-01 NOTE — PROGRESS NOTE ADULT - SUBJECTIVE AND OBJECTIVE BOX
Postpartum Note,  Section  She is a  24y woman who is now post-operative day: 3    Subjective:  The patient feels well.  She is ambulating.   She is tolerating regular diet.  She denies nausea and vomiting.  She is voiding.  Her pain is controlled.  She reports normal postpartum bleeding.    Vital Signs Last 24 Hrs  T(C): 37.1 (2025 09:30), Max: 37.2 (31 Mar 2025 18:12)  T(F): 98.7 (2025 09:30), Max: 99 (31 Mar 2025 18:12)  HR: 95 (2025 10:30) (95 - 118)  BP: 144/87 (2025 10:30) (132/81 - 152/101)  BP(mean): --  RR: 19 (2025 09:30) (16 - 19)  SpO2: 99% (2025 09:30) (97% - 100%)    Parameters below as of 2025 09:30  Patient On (Oxygen Delivery Method): room air        Physical exam:  Gen: NAD  Breast: Soft, nontender, not engorged.  Abdomen: Soft, nontender, no distension , firm uterine fundus at umbilicus.  Incision: Clean, dry, and intact with steri strips  Pelvic: Normal lochia noted  Ext: No calf tenderness    LABS:                        7.1    17.39 )-----------( 246      ( 31 Mar 2025 05:00 )             22.3         Allergies    No Known Allergies    Intolerances      MEDICATIONS  (STANDING):  acetaminophen   Oral Liquid .. 975 milliGRAM(s) Oral every 6 hours  ascorbic acid 500 milliGRAM(s) Oral daily  ferrous    sulfate 325 milliGRAM(s) Oral three times a day  heparin   Injectable 5000 Unit(s) SubCutaneous every 12 hours  ibuprofen  Suspension. 600 milliGRAM(s) Oral every 6 hours  labetalol 200 milliGRAM(s) Oral every 8 hours  lactated ringers. 1000 milliLiter(s) (125 mL/Hr) IV Continuous <Continuous>  lactated ringers. 1000 milliLiter(s) (75 mL/Hr) IV Continuous <Continuous>  NIFEdipine XL 60 milliGRAM(s) Oral daily  NIFEdipine XL 30 milliGRAM(s) Oral daily    MEDICATIONS  (PRN):  diphenhydrAMINE 25 milliGRAM(s) Oral every 6 hours PRN Pruritus  lanolin Ointment 1 Application(s) Topical every 6 hours PRN Sore Nipples  magnesium hydroxide Suspension 30 milliLiter(s) Oral two times a day PRN Constipation  oxyCODONE    IR 5 milliGRAM(s) Oral every 3 hours PRN Moderate to Severe Pain (4-10)  oxyCODONE    IR 5 milliGRAM(s) Oral once PRN Moderate to Severe Pain (4-10)  oxyCODONE    IR 5 milliGRAM(s) Oral once PRN Moderate to Severe Pain (4-10)  senna 1 Tablet(s) Oral two times a day PRN Constipation  simethicone 80 milliGRAM(s) Chew every 4 hours PRN Gas        Assessment and Plan  POD # 3 s/p  section  Doing well.  Encourage ambulation.

## 2025-04-01 NOTE — CHART NOTE - NSCHARTNOTEFT_GEN_A_CORE
***Incomplete note, to be finalized     Evaluated patient at beside due to severe range BPs.     Tanya Ayala PGY1 Evaluated patient at beside due to severe range BPs. Patient is now POD3 s/p pLTCS c/b preeclampsia with severe feature s/p magnesium from 3/29-30. Blood pressures have been persistently elevated and have required several uptitrations of medications. Currently patient is prescribed Procardia 90XL daily and labetalol 200 TID (increased from 200 BID this AM).     Per RN, BPs this afternoon at ~2pm were 153/103 with 15 minute repeat 160/100. Labetalol 20mg IVP was ordered to be given. 10 minute repeat was 153/101. Additional lab 40mg IV was ordered. At that time went to evaluate patient at bedside. Patient reports overall feeling well. She denies HA, vision changes, chest pain, SOB, new onset swelling or RUQ pain. She is ambulating, tolerating PO w/o N/V, is voiding and passing gas. 10 minute repeat after labetalol 40mg IVP was performed when I was in the room and was 158/108. At this point, left room to call attending and consult Cardio OB for further recommendations. STAT HELLP labs were ordered as well. Spoke with Dr. Hannon (cardio OB) who recommended uptitration of labetalol 200 TID to labetalol 400 TID and to give her a dose of the lab 400 mg now. Per Dr. Hannon, if patient develops further SR BPs greater than 160/110, can treat with procardia IR. Cardio OB will see the patient tomorrow. Labetalol 400 mg was administered and blood pressure was checked again 1 hour later and was 165/108. Procardia 10mg IR was administered. 20 minute BP repeat was 122/65. Received call from RN ~6:20 pm that patient was having heart palpitations with tachycardia to 120bpm which started after administration of the procardia. Discussed this with Dr. Torres and an EKG was ordered. Discussed events with night team taking over care of patient with CBC and EKG pending. Will continue to monitor BPs.     Tanya Ayala PGY1  D/w Dr. Torres

## 2025-04-01 NOTE — PROGRESS NOTE ADULT - ASSESSMENT
23y/o POD#3 from pLTCS. PMHx by sPEC. Pt anmol wheat.    #sPEC  - sp Mg  - Cw Pro 90 and Lab 200 BID  - cont to monitor pressures/ symptoms     #Postpartum  - Continue with po analgesia  - Increase ambulation  - Continue regular diet  - Incision site c/d/i    Sydney Bower PGY1

## 2025-04-02 LAB
ALBUMIN SERPL ELPH-MCNC: 2.9 G/DL — LOW (ref 3.3–5)
ALP SERPL-CCNC: 156 U/L — HIGH (ref 40–120)
ALT FLD-CCNC: 26 U/L — SIGNIFICANT CHANGE UP (ref 4–33)
ANION GAP SERPL CALC-SCNC: 12 MMOL/L — SIGNIFICANT CHANGE UP (ref 7–14)
AST SERPL-CCNC: 34 U/L — HIGH (ref 4–32)
BASOPHILS # BLD AUTO: 0.04 K/UL — SIGNIFICANT CHANGE UP (ref 0–0.2)
BASOPHILS NFR BLD AUTO: 0.3 % — SIGNIFICANT CHANGE UP (ref 0–2)
BILIRUB SERPL-MCNC: 0.4 MG/DL — SIGNIFICANT CHANGE UP (ref 0.2–1.2)
BUN SERPL-MCNC: 11 MG/DL — SIGNIFICANT CHANGE UP (ref 7–23)
CALCIUM SERPL-MCNC: 8.4 MG/DL — SIGNIFICANT CHANGE UP (ref 8.4–10.5)
CHLORIDE SERPL-SCNC: 103 MMOL/L — SIGNIFICANT CHANGE UP (ref 98–107)
CO2 SERPL-SCNC: 21 MMOL/L — LOW (ref 22–31)
CREAT SERPL-MCNC: 0.65 MG/DL — SIGNIFICANT CHANGE UP (ref 0.5–1.3)
EGFR: 126 ML/MIN/1.73M2 — SIGNIFICANT CHANGE UP
EGFR: 126 ML/MIN/1.73M2 — SIGNIFICANT CHANGE UP
EOSINOPHIL # BLD AUTO: 0.29 K/UL — SIGNIFICANT CHANGE UP (ref 0–0.5)
EOSINOPHIL NFR BLD AUTO: 2 % — SIGNIFICANT CHANGE UP (ref 0–6)
GLUCOSE SERPL-MCNC: 93 MG/DL — SIGNIFICANT CHANGE UP (ref 70–99)
HCT VFR BLD CALC: 26.6 % — LOW (ref 34.5–45)
HGB BLD-MCNC: 8.8 G/DL — LOW (ref 11.5–15.5)
IANC: 11.27 K/UL — HIGH (ref 1.8–7.4)
IMM GRANULOCYTES NFR BLD AUTO: 1.4 % — HIGH (ref 0–0.9)
LYMPHOCYTES # BLD AUTO: 1.94 K/UL — SIGNIFICANT CHANGE UP (ref 1–3.3)
LYMPHOCYTES # BLD AUTO: 13.2 % — SIGNIFICANT CHANGE UP (ref 13–44)
MCHC RBC-ENTMCNC: 28.9 PG — SIGNIFICANT CHANGE UP (ref 27–34)
MCHC RBC-ENTMCNC: 33.1 G/DL — SIGNIFICANT CHANGE UP (ref 32–36)
MCV RBC AUTO: 87.5 FL — SIGNIFICANT CHANGE UP (ref 80–100)
MONOCYTES # BLD AUTO: 0.97 K/UL — HIGH (ref 0–0.9)
MONOCYTES NFR BLD AUTO: 6.6 % — SIGNIFICANT CHANGE UP (ref 2–14)
NEUTROPHILS # BLD AUTO: 11.27 K/UL — HIGH (ref 1.8–7.4)
NEUTROPHILS NFR BLD AUTO: 76.5 % — SIGNIFICANT CHANGE UP (ref 43–77)
NRBC # BLD AUTO: 0.05 K/UL — HIGH (ref 0–0)
NRBC # FLD: 0.05 K/UL — HIGH (ref 0–0)
NRBC BLD AUTO-RTO: 0 /100 WBCS — SIGNIFICANT CHANGE UP (ref 0–0)
PLATELET # BLD AUTO: 242 K/UL — SIGNIFICANT CHANGE UP (ref 150–400)
POTASSIUM SERPL-MCNC: 4.3 MMOL/L — SIGNIFICANT CHANGE UP (ref 3.5–5.3)
POTASSIUM SERPL-SCNC: 4.3 MMOL/L — SIGNIFICANT CHANGE UP (ref 3.5–5.3)
PROT SERPL-MCNC: 5.6 G/DL — LOW (ref 6–8.3)
RBC # BLD: 3.04 M/UL — LOW (ref 3.8–5.2)
RBC # FLD: 13.5 % — SIGNIFICANT CHANGE UP (ref 10.3–14.5)
SODIUM SERPL-SCNC: 136 MMOL/L — SIGNIFICANT CHANGE UP (ref 135–145)
WBC # BLD: 14.71 K/UL — HIGH (ref 3.8–10.5)
WBC # FLD AUTO: 14.71 K/UL — HIGH (ref 3.8–10.5)

## 2025-04-02 PROCEDURE — 99232 SBSQ HOSP IP/OBS MODERATE 35: CPT

## 2025-04-02 RX ADMIN — Medication 60 MILLIGRAM(S): at 14:06

## 2025-04-02 RX ADMIN — Medication 325 MILLIGRAM(S): at 14:06

## 2025-04-02 RX ADMIN — Medication 600 MILLIGRAM(S): at 12:50

## 2025-04-02 RX ADMIN — HEPARIN SODIUM 5000 UNIT(S): 1000 INJECTION INTRAVENOUS; SUBCUTANEOUS at 18:41

## 2025-04-02 RX ADMIN — Medication 975 MILLIGRAM(S): at 20:42

## 2025-04-02 RX ADMIN — Medication 600 MILLIGRAM(S): at 06:06

## 2025-04-02 RX ADMIN — HEPARIN SODIUM 5000 UNIT(S): 1000 INJECTION INTRAVENOUS; SUBCUTANEOUS at 05:38

## 2025-04-02 RX ADMIN — Medication 600 MILLIGRAM(S): at 23:50

## 2025-04-02 RX ADMIN — Medication 975 MILLIGRAM(S): at 04:07

## 2025-04-02 RX ADMIN — Medication 600 MILLIGRAM(S): at 05:36

## 2025-04-02 RX ADMIN — Medication 30 MILLIGRAM(S): at 14:06

## 2025-04-02 RX ADMIN — LABETALOL HYDROCHLORIDE 400 MILLIGRAM(S): 200 TABLET, FILM COATED ORAL at 16:41

## 2025-04-02 RX ADMIN — Medication 325 MILLIGRAM(S): at 05:40

## 2025-04-02 RX ADMIN — LABETALOL HYDROCHLORIDE 400 MILLIGRAM(S): 200 TABLET, FILM COATED ORAL at 08:35

## 2025-04-02 RX ADMIN — LABETALOL HYDROCHLORIDE 400 MILLIGRAM(S): 200 TABLET, FILM COATED ORAL at 23:51

## 2025-04-02 RX ADMIN — Medication 600 MILLIGRAM(S): at 12:13

## 2025-04-02 RX ADMIN — Medication 975 MILLIGRAM(S): at 03:37

## 2025-04-02 RX ADMIN — Medication 975 MILLIGRAM(S): at 21:30

## 2025-04-02 RX ADMIN — Medication 325 MILLIGRAM(S): at 23:50

## 2025-04-02 RX ADMIN — Medication 600 MILLIGRAM(S): at 19:30

## 2025-04-02 RX ADMIN — Medication 500 MILLIGRAM(S): at 14:06

## 2025-04-02 RX ADMIN — Medication 600 MILLIGRAM(S): at 18:46

## 2025-04-02 NOTE — PROGRESS NOTE ADULT - SUBJECTIVE AND OBJECTIVE BOX
R1 Progress Note    Patient seen and examined at bedside. Overnight pt Hb noted to be 6.3, pt ordered for 2 uPRBCs with second unit hanging at bedside. No acute complaints, pain well controlled. Patient is ambulating and tolerating regular diet. Pt is passing flatus and voiding spontaneously. Denies fever, chills, CP, SOB, N/V, HA, blurred vision.     Vital Signs Last 24 Hours  T(C): 36.8 (04-02-25 @ 02:43), Max: 37.1 (04-01-25 @ 05:57)  HR: 85 (04-02-25 @ 02:43) (85 - 120)  BP: 129/86 (04-02-25 @ 02:43) (122/65 - 165/108)  RR: 18 (04-02-25 @ 02:43) (16 - 20)  SpO2: 98% (04-02-25 @ 02:43) (96% - 100%)    I&O's Summary    31 Mar 2025 07:01  -  01 Apr 2025 07:00  --------------------------------------------------------  IN: 0 mL / OUT: 500 mL / NET: -500 mL        Physical Exam:  General: NAD  Abdomen: Soft, non-tender, non-distended, fundus firm  Incision: Pfannenstiel incision CDI, subcuticular suture closure  Pelvic: Lochia wnl    Labs:    Blood Type: A Positive  Antibody Screen: Negative  RPR: Negative               6.3    14.26 )-----------( 195      ( 04-01 @ 17:41 )             19.3                7.1    17.39 )-----------( 246      ( 03-31 @ 05:00 )             22.3                7.1    18.29 )-----------( 230      ( 03-30 @ 20:20 )             21.7         MEDICATIONS  (STANDING):  acetaminophen   Oral Liquid .. 975 milliGRAM(s) Oral every 6 hours  ascorbic acid 500 milliGRAM(s) Oral daily  ferrous    sulfate 325 milliGRAM(s) Oral three times a day  heparin   Injectable 5000 Unit(s) SubCutaneous every 12 hours  ibuprofen  Suspension. 600 milliGRAM(s) Oral every 6 hours  labetalol 400 milliGRAM(s) Oral every 8 hours  lactated ringers. 1000 milliLiter(s) (125 mL/Hr) IV Continuous <Continuous>  lactated ringers. 1000 milliLiter(s) (75 mL/Hr) IV Continuous <Continuous>  NIFEdipine XL 30 milliGRAM(s) Oral daily  NIFEdipine XL 60 milliGRAM(s) Oral daily    MEDICATIONS  (PRN):  diphenhydrAMINE 25 milliGRAM(s) Oral every 6 hours PRN Pruritus  lanolin Ointment 1 Application(s) Topical every 6 hours PRN Sore Nipples  magnesium hydroxide Suspension 30 milliLiter(s) Oral two times a day PRN Constipation  oxyCODONE    IR 5 milliGRAM(s) Oral once PRN Moderate to Severe Pain (4-10)  oxyCODONE    IR 5 milliGRAM(s) Oral every 3 hours PRN Moderate to Severe Pain (4-10)  oxyCODONE    IR 5 milliGRAM(s) Oral once PRN Moderate to Severe Pain (4-10)  senna 1 Tablet(s) Oral two times a day PRN Constipation  simethicone 80 milliGRAM(s) Chew every 4 hours PRN Gas

## 2025-04-02 NOTE — PROGRESS NOTE ADULT - ASSESSMENT
25y/o POD#4 from pLTCS. PMHx by sPEC and anemia. Pt currenlty stable.    #anemia  - H&H trend as above  - recieving 2 uPRBCs  - f/u AM post transfusion CBC     #sPEC  - sp Mg  - s/p Lab 20, 20/40, Pro 10 (4/1)  - Uptitrated to Pro 90 and Lab 400 TID  - f/u cards OB consult  - cont to monitor pressures/ symptoms   - F/u AM HELLP    #Postpartum  - Continue with po analgesia  - Increase ambulation  - Continue regular diet  - Incision site c/d/i    Sydney Bower PGY1

## 2025-04-02 NOTE — PROGRESS NOTE ADULT - SUBJECTIVE AND OBJECTIVE BOX
Post Op C/S      Pt without complaints  vital signs stable- strill with elevated bp  s/p 2 u prbc    Vital Signs Last 24 Hrs  T(C): 36.7 (04-02-25 @ 05:00), Max: 37.1 (04-01-25 @ 09:30)  T(F): 98 (04-02-25 @ 05:00), Max: 98.8 (04-01-25 @ 23:35)  HR: 87 (04-02-25 @ 05:00) (85 - 120)  BP: 146/89 (04-02-25 @ 05:00) (122/65 - 165/108)  BP(mean): --  RR: 18 (04-02-25 @ 05:00) (16 - 20)  SpO2: 96% (04-02-25 @ 05:00) (96% - 99%)                          6.3    14.26 )-----------( 195      ( 01 Apr 2025 17:41 )             19.3         Abdomen soft  fundus firm  Incision clean dry and intact  extremities non tender    lochia wnl      awaiting cardiology  s/p 2 u prbc

## 2025-04-02 NOTE — CONSULT NOTE ADULT - SUBJECTIVE AND OBJECTIVE BOX
23 yo F with no PMH presenting with severe range blood pressures, post op day #4 from Csection for PEC. Pressure has been difficult to control, necessitating multiple IVP's of Labetalol as well as Nifedipine IR. She has been on Nifedipine 90 mg daily for three days, Labetalol increased from 200 tid to 400 TID yesterday afternoon. She denies shortness of breath, palpitations, chest pressure, headache, change in vision. Admits that she is anxious. States that she feels that her BPs have never been "normal" even before pregnancy.      O:  T(C): 36.8 (04-02-25 @ 14:02), Max: 37.1 (04-01-25 @ 23:35)  HR: 94 (04-02-25 @ 16:30) (85 - 112)  BP: 153/103 (04-02-25 @ 16:30) (129/86 - 154/103)  RR: 18 (04-02-25 @ 16:30) (18 - 18)  SpO2: 100% (04-02-25 @ 16:30) (96% - 100%)        O/E:  Gen: NAD  HEENT: EOMI  CV: RRR, normal S1 + S2, no m/r/g  Lungs: CTAB  Abd: soft, non-tender  Ext: No edema    Labs:                        8.8    14.71 )-----------( 242      ( 02 Apr 2025 09:23 )             26.6     04-02    136  |  103  |  11  ----------------------------<  93  4.3   |  21[L]  |  0.65    Ca    8.4      02 Apr 2025 09:23    TPro  5.6[L]  /  Alb  2.9[L]  /  TBili  0.4  /  DBili  x   /  AST  34[H]  /  ALT  26  /  AlkPhos  156[H]  04-02              Meds:  MEDICATIONS  (STANDING):  acetaminophen   Oral Liquid .. 975 milliGRAM(s) Oral every 6 hours  ascorbic acid 500 milliGRAM(s) Oral daily  ferrous    sulfate 325 milliGRAM(s) Oral three times a day  heparin   Injectable 5000 Unit(s) SubCutaneous every 12 hours  ibuprofen  Suspension. 600 milliGRAM(s) Oral every 6 hours  labetalol 400 milliGRAM(s) Oral every 8 hours  NIFEdipine XL 60 milliGRAM(s) Oral daily  NIFEdipine XL 30 milliGRAM(s) Oral daily

## 2025-04-02 NOTE — CONSULT NOTE ADULT - ASSESSMENT
--Would obtain TTE in am  --Noted that BPs are still elevated, fourth day postop (not unexpected given natural PEC progression). Can continue same doses of medications, Procardia 90 mg and Labetalol 400 TID (can increase to 500 TID if needed)  *** Please use Pediatric BP cuff for BP checks**

## 2025-04-03 ENCOUNTER — RESULT REVIEW (OUTPATIENT)
Age: 25
End: 2025-04-03

## 2025-04-03 PROCEDURE — 93306 TTE W/DOPPLER COMPLETE: CPT | Mod: 26

## 2025-04-03 PROCEDURE — 93356 MYOCRD STRAIN IMG SPCKL TRCK: CPT

## 2025-04-03 PROCEDURE — 76376 3D RENDER W/INTRP POSTPROCES: CPT | Mod: 26

## 2025-04-03 RX ORDER — NIFEDIPINE 30 MG
10 TABLET, EXTENDED RELEASE 24 HR ORAL ONCE
Refills: 0 | Status: COMPLETED | OUTPATIENT
Start: 2025-04-03 | End: 2025-04-03

## 2025-04-03 RX ORDER — LABETALOL HYDROCHLORIDE 200 MG/1
100 TABLET, FILM COATED ORAL ONCE
Refills: 0 | Status: COMPLETED | OUTPATIENT
Start: 2025-04-03 | End: 2025-04-03

## 2025-04-03 RX ORDER — LABETALOL HYDROCHLORIDE 200 MG/1
600 TABLET, FILM COATED ORAL THREE TIMES A DAY
Refills: 0 | Status: DISCONTINUED | OUTPATIENT
Start: 2025-04-03 | End: 2025-04-06

## 2025-04-03 RX ORDER — LABETALOL HYDROCHLORIDE 200 MG/1
500 TABLET, FILM COATED ORAL EVERY 8 HOURS
Refills: 0 | Status: DISCONTINUED | OUTPATIENT
Start: 2025-04-03 | End: 2025-04-03

## 2025-04-03 RX ADMIN — LABETALOL HYDROCHLORIDE 500 MILLIGRAM(S): 200 TABLET, FILM COATED ORAL at 15:39

## 2025-04-03 RX ADMIN — Medication 600 MILLIGRAM(S): at 23:48

## 2025-04-03 RX ADMIN — Medication 325 MILLIGRAM(S): at 08:07

## 2025-04-03 RX ADMIN — LABETALOL HYDROCHLORIDE 600 MILLIGRAM(S): 200 TABLET, FILM COATED ORAL at 22:01

## 2025-04-03 RX ADMIN — LABETALOL HYDROCHLORIDE 100 MILLIGRAM(S): 200 TABLET, FILM COATED ORAL at 00:29

## 2025-04-03 RX ADMIN — Medication 325 MILLIGRAM(S): at 21:02

## 2025-04-03 RX ADMIN — Medication 600 MILLIGRAM(S): at 12:22

## 2025-04-03 RX ADMIN — Medication 325 MILLIGRAM(S): at 14:32

## 2025-04-03 RX ADMIN — Medication 30 MILLIGRAM(S): at 14:32

## 2025-04-03 RX ADMIN — Medication 975 MILLIGRAM(S): at 08:07

## 2025-04-03 RX ADMIN — LABETALOL HYDROCHLORIDE 500 MILLIGRAM(S): 200 TABLET, FILM COATED ORAL at 08:07

## 2025-04-03 RX ADMIN — Medication 975 MILLIGRAM(S): at 16:00

## 2025-04-03 RX ADMIN — Medication 1 APPLICATION(S): at 00:29

## 2025-04-03 RX ADMIN — Medication 600 MILLIGRAM(S): at 06:21

## 2025-04-03 RX ADMIN — Medication 500 MILLIGRAM(S): at 12:22

## 2025-04-03 RX ADMIN — Medication 25 MILLIGRAM(S): at 21:02

## 2025-04-03 RX ADMIN — Medication 975 MILLIGRAM(S): at 15:08

## 2025-04-03 RX ADMIN — Medication 600 MILLIGRAM(S): at 23:18

## 2025-04-03 RX ADMIN — HEPARIN SODIUM 5000 UNIT(S): 1000 INJECTION INTRAVENOUS; SUBCUTANEOUS at 18:13

## 2025-04-03 RX ADMIN — Medication 10 MILLIGRAM(S): at 15:38

## 2025-04-03 RX ADMIN — Medication 975 MILLIGRAM(S): at 09:00

## 2025-04-03 RX ADMIN — Medication 10 MILLIGRAM(S): at 15:03

## 2025-04-03 RX ADMIN — HEPARIN SODIUM 5000 UNIT(S): 1000 INJECTION INTRAVENOUS; SUBCUTANEOUS at 06:21

## 2025-04-03 RX ADMIN — Medication 600 MILLIGRAM(S): at 00:30

## 2025-04-03 RX ADMIN — Medication 60 MILLIGRAM(S): at 14:32

## 2025-04-03 RX ADMIN — Medication 600 MILLIGRAM(S): at 18:12

## 2025-04-03 RX ADMIN — Medication 600 MILLIGRAM(S): at 06:50

## 2025-04-03 RX ADMIN — Medication 600 MILLIGRAM(S): at 13:00

## 2025-04-03 NOTE — PROVIDER CONTACT NOTE (OTHER) - DATE AND TIME:
30-Mar-2025 22:20
31-Mar-2025 07:28
31-Mar-2025 14:23
03-Apr-2025 14:25
01-Apr-2025 09:51
30-Mar-2025 21:20
31-Mar-2025 05:47

## 2025-04-03 NOTE — PROGRESS NOTE ADULT - ASSESSMENT
25y/o POD#5 from pLTCS. PMHx by sPEC and anemia. Pt currenlty stable.    #anemia  - H&H trend as above  - s/p 2uPRBCs with adequate rise in H&H    #sPEC  - sp Mg  - Uptitrated to Pro 90 and Lab 500 TID  - Cards OB: TTE, cw Pro90 and Lab 400TID (can uptitrate to 500TID), pediatric cuff  - f/u echo  - cont to monitor pressures/ symptoms     #Postpartum  - Continue with po analgesia  - Increase ambulation  - Continue regular diet  - Incision site c/d/i    Sydney Bower PGY1

## 2025-04-03 NOTE — CHART NOTE - NSCHARTNOTEFT_GEN_A_CORE
COVERING NP: Gus XIAO HPI/OVERNIGHT EVENTS:    SUBJECTIVE: Patient seen and examined at bedside.         OBJECTIVE:    VITAL SIGNS:  ICU Vital Signs Last 24 Hrs  T(C): 36.9 (2025 14:20), Max: 37.4 (2025 22:30)  T(F): 98.4 (2025 14:20), Max: 99.4 (2025 22:30)  HR: 101 (2025 16:00) (88 - 103)  BP: 171/118 (2025 16:00) (128/90 - 173/106)  RR: 19 (2025 14:20) (17 - 20)  SpO2: 100% (2025 14:20) (99% - 100%)    O2 Parameters below as of 2025 14:20  Patient On (Oxygen Delivery Method): room air        Plateau pressure:   P/F ratio:     CAPILLARY BLOOD GLUCOSE        ECG:    PHYSICAL EXAM:    General: NAD  HEENT: clear conjunctiva  Neck: supple  Respiratory: CTA b/l  Cardiovascular: +S1/S2; RRR  Abdomen: soft, NT/ND; +BS x4  Extremities: 2+ peripheral pulses b/l; no LE edema  Skin: normal color and turgor; no rash  Neurological:    MEDICATIONS:  MEDICATIONS  (STANDING):  acetaminophen   Oral Liquid .. 975 milliGRAM(s) Oral every 6 hours  ascorbic acid 500 milliGRAM(s) Oral daily  ferrous    sulfate 325 milliGRAM(s) Oral three times a day  heparin   Injectable 5000 Unit(s) SubCutaneous every 12 hours  ibuprofen  Suspension. 600 milliGRAM(s) Oral every 6 hours  labetalol 500 milliGRAM(s) Oral every 8 hours  NIFEdipine XL 60 milliGRAM(s) Oral daily  NIFEdipine XL 30 milliGRAM(s) Oral daily    MEDICATIONS  (PRN):  diphenhydrAMINE 25 milliGRAM(s) Oral every 6 hours PRN Pruritus  lanolin Ointment 1 Application(s) Topical every 6 hours PRN Sore Nipples  magnesium hydroxide Suspension 30 milliLiter(s) Oral two times a day PRN Constipation  oxyCODONE    IR 5 milliGRAM(s) Oral once PRN Moderate to Severe Pain (4-10)  oxyCODONE    IR 5 milliGRAM(s) Oral every 3 hours PRN Moderate to Severe Pain (4-10)  oxyCODONE    IR 5 milliGRAM(s) Oral once PRN Moderate to Severe Pain (4-10)  senna 1 Tablet(s) Oral two times a day PRN Constipation  simethicone 80 milliGRAM(s) Chew every 4 hours PRN Gas      LABS:                        8.8    14.71 )-----------( 242      ( 2025 09:23 )             26.6     04-    136  |  103  |  11  ----------------------------<  93  4.3   |  21[L]  |  0.65    Ca    8.4      2025 09:23    TPro  5.6[L]  /  Alb  2.9[L]  /  TBili  0.4  /  DBili  x   /  AST  34[H]  /  ALT  26  /  AlkPhos  156[H]  04      Urinalysis Basic - ( 2025 09:23 )    Color: x / Appearance: x / SG: x / pH: x  Gluc: 93 mg/dL / Ketone: x  / Bili: x / Urobili: x   Blood: x / Protein: x / Nitrite: x   Leuk Esterase: x / RBC: x / WBC x   Sq Epi: x / Non Sq Epi: x / Bacteria: x        RADIOLOGY & ADDITIONAL TESTS: Reviewed.      TTE W or WO Ultrasound Enhancing Agent (25 @ 10:19)     1. Left ventricular cavity is normal in size. Left ventricular wall thickness is normal. Left ventricular systolic function is normal with an ejection fraction of 56 % by 3D. There are no regional wall motion abnormalities seen.   2. Normal left ventricular diastolic function.   3. Left ventricular globallongitudinal strain is -19.0 % which is normal (< -18%). Images were acquired on a [employer] ultrasound system and processed on the ultrasound machine with a heart rate of 82 bpm and a blood pressure of 138/93 mmHg.   4. Normal right ventricular cavity size and normal right ventricular systolic function. Tricuspid annular plane systolic excursion (TAPSE) is 2.0 cm (normal >=1.7 cm).   5. Structurally normal mitral valve with normal leaflet excursion. There is trace mitral regurgitation.   6. The aortic valve appears trileaflet with normal systolic excursion. There is mild aortic regurgitation.            25 yo F with no PMHx presenting with severe range blood pressures, post op day #5 from  for PEC. Pressure has been difficult to control, necessitating multiple IVP's of Labetalol as well as Nifedipine IR. She has been on Nifedipine 90 mg daily for three days, Labetalol increased from 200 tid to 400 TID. She denies shortness of breath, palpitations, chest pressure, headache, change in vision. Admits that she is anxious. States that she feels that her BPs have never been "normal" even before pregnancy.      Neuro:  -A&Ox3    Resp:  -Satting well on RA      Cardiac:   #Pre-eclampsia   - Left ventricular cavity is normal in size. Left ventricular wall thickness is normal. Left ventricular systolic function is normal with an ejection fraction of 56 % by 3D  - Increase labetalol 600 PO TID  - Procardia 60mg PO BID  - Add hydrazine 25mg PO TID   - B/p goal SBP<130   - Start Cardene gtt if B/P remains elevated at 6pm (d/c Procardia once gtt started)    GI:  - Continue diet     :  - Scr WNL     Heme:  #anemia  - H&H trend as above  - s/p 2uPRBCs with adequate rise in H&H    #Postpartum  - Continue with po analgesia  - Continue regular diet  - Incision site c/d/i    DVT PPx:  - HSQ    THIS NOTE IS INCOMPLETE.  PLEASE DON'T USE SUGGESTIONS UNTIL PATIENT IS IN THE CCU COVERING NP: Gus XIAO HPI/OVERNIGHT EVENTS:    SUBJECTIVE: Patient seen and examined at bedside.         OBJECTIVE: No chest pain, SOB, palpitations, dizziness, LOC     VITAL SIGNS:  ICU Vital Signs Last 24 Hrs  T(C): 36.9 (2025 14:20), Max: 37.4 (2025 22:30)  T(F): 98.4 (2025 14:20), Max: 99.4 (2025 22:30)  HR: 101 (2025 16:00) (88 - 103)  BP: 171/118 (2025 16:00) (128/90 - 173/106)  RR: 19 (2025 14:20) (17 - 20)  SpO2: 100% (2025 14:20) (99% - 100%)    O2 Parameters below as of 2025 14:20  Patient On (Oxygen Delivery Method): room air        CAPILLARY BLOOD GLUCOSE      ECG: NSR    PHYSICAL EXAM:    General: NAD  HEENT: clear conjunctiva  Neck: supple  Respiratory: CTA b/l  Cardiovascular: +S1/S2; RRR  Abdomen: soft, NT/ND; +BS x4  Extremities: 2+ peripheral pulses b/l; no LE edema  Skin: normal color and turgor; no rash  Neurological:    MEDICATIONS:  MEDICATIONS  (STANDING):  acetaminophen   Oral Liquid .. 975 milliGRAM(s) Oral every 6 hours  ascorbic acid 500 milliGRAM(s) Oral daily  ferrous    sulfate 325 milliGRAM(s) Oral three times a day  heparin   Injectable 5000 Unit(s) SubCutaneous every 12 hours  ibuprofen  Suspension. 600 milliGRAM(s) Oral every 6 hours  labetalol 500 milliGRAM(s) Oral every 8 hours  NIFEdipine XL 60 milliGRAM(s) Oral daily  NIFEdipine XL 30 milliGRAM(s) Oral daily    MEDICATIONS  (PRN):  diphenhydrAMINE 25 milliGRAM(s) Oral every 6 hours PRN Pruritus  lanolin Ointment 1 Application(s) Topical every 6 hours PRN Sore Nipples  magnesium hydroxide Suspension 30 milliLiter(s) Oral two times a day PRN Constipation  oxyCODONE    IR 5 milliGRAM(s) Oral once PRN Moderate to Severe Pain (4-10)  oxyCODONE    IR 5 milliGRAM(s) Oral every 3 hours PRN Moderate to Severe Pain (4-10)  oxyCODONE    IR 5 milliGRAM(s) Oral once PRN Moderate to Severe Pain (4-10)  senna 1 Tablet(s) Oral two times a day PRN Constipation  simethicone 80 milliGRAM(s) Chew every 4 hours PRN Gas      LABS:                        8.8    14.71 )-----------( 242      ( 2025 09:23 )             26.6         136  |  103  |  11  ----------------------------<  93  4.3   |  21[L]  |  0.65    Ca    8.4      2025 09:23    TPro  5.6[L]  /  Alb  2.9[L]  /  TBili  0.4  /  DBili  x   /  AST  34[H]  /  ALT  26  /  AlkPhos  156[H]  04-      Urinalysis Basic - ( 2025 09:23 )    Color: x / Appearance: x / SG: x / pH: x  Gluc: 93 mg/dL / Ketone: x  / Bili: x / Urobili: x   Blood: x / Protein: x / Nitrite: x   Leuk Esterase: x / RBC: x / WBC x   Sq Epi: x / Non Sq Epi: x / Bacteria: x        RADIOLOGY & ADDITIONAL TESTS: Reviewed.      TTE W or WO Ultrasound Enhancing Agent (25 @ 10:19)     1. Left ventricular cavity is normal in size. Left ventricular wall thickness is normal. Left ventricular systolic function is normal with an ejection fraction of 56 % by 3D. There are no regional wall motion abnormalities seen.   2. Normal left ventricular diastolic function.   3. Left ventricular globallongitudinal strain is -19.0 % which is normal (< -18%). Images were acquired on a [employer] ultrasound system and processed on the ultrasound machine with a heart rate of 82 bpm and a blood pressure of 138/93 mmHg.   4. Normal right ventricular cavity size and normal right ventricular systolic function. Tricuspid annular plane systolic excursion (TAPSE) is 2.0 cm (normal >=1.7 cm).   5. Structurally normal mitral valve with normal leaflet excursion. There is trace mitral regurgitation.   6. The aortic valve appears trileaflet with normal systolic excursion. There is mild aortic regurgitation.            23 yo F with no PMHx presenting with severe range blood pressures, post op day #5 from  for PEC. Pressure has been difficult to control, necessitating multiple IVP's of Labetalol as well as Nifedipine IR. She has been on Nifedipine 90 mg daily for three days, Labetalol increased from 200 tid to 400 TID. She denies shortness of breath, palpitations, chest pressure, headache, change in vision. Admits that she is anxious. States that she feels that her BPs have never been "normal" even before pregnancy.      Neuro:  -A&Ox3    Resp:  -Satting well on RA      Cardiac:   #Pre-eclampsia   - Left ventricular cavity is normal in size. Left ventricular wall thickness is normal. Left ventricular systolic function is normal with an ejection fraction of 56 % by 3D  - Increase labetalol 600 PO TID  - Continue Procardia 60mg PO am and Procardia 30mg PO daily   - Add hydrazine 25mg PO TID   - B/p goal SBP<130   - Start Cardene gtt if B/P remains elevated at 6pm (d/c Procardia once gtt started)    GI:  - Continue diet     :  - Scr WNL     Heme:  #anemia  - H&H trend as above  - s/p 2uPRBCs with adequate rise in H&H    #Postpartum  - Continue with po analgesia  - Continue regular diet  - Incision site c/d/i    DVT PPx:  - HSQ COVERING NP: Gus XIAO HPI/OVERNIGHT EVENTS:    SUBJECTIVE: Patient seen and examined at bedside.         OBJECTIVE: No chest pain, SOB, palpitations, dizziness, LOC     VITAL SIGNS:  ICU Vital Signs Last 24 Hrs  T(C): 36.9 (2025 14:20), Max: 37.4 (2025 22:30)  T(F): 98.4 (2025 14:20), Max: 99.4 (2025 22:30)  HR: 101 (2025 16:00) (88 - 103)  BP: 171/118 (2025 16:00) (128/90 - 173/106)  RR: 19 (2025 14:20) (17 - 20)  SpO2: 100% (2025 14:20) (99% - 100%)    O2 Parameters below as of 2025 14:20  Patient On (Oxygen Delivery Method): room air        CAPILLARY BLOOD GLUCOSE      ECG: NSR    PHYSICAL EXAM:    General: NAD  HEENT: clear conjunctiva  Neck: supple  Respiratory: CTA b/l  Cardiovascular: +S1/S2; RRR  Abdomen: soft, NT/ND; +BS x4  Extremities: 2+ peripheral pulses b/l; no LE edema  Skin: normal color and turgor; no rash  Neurological:    MEDICATIONS:  MEDICATIONS  (STANDING):  acetaminophen   Oral Liquid .. 975 milliGRAM(s) Oral every 6 hours  ascorbic acid 500 milliGRAM(s) Oral daily  ferrous    sulfate 325 milliGRAM(s) Oral three times a day  heparin   Injectable 5000 Unit(s) SubCutaneous every 12 hours  ibuprofen  Suspension. 600 milliGRAM(s) Oral every 6 hours  labetalol 500 milliGRAM(s) Oral every 8 hours  NIFEdipine XL 60 milliGRAM(s) Oral daily  NIFEdipine XL 30 milliGRAM(s) Oral daily    MEDICATIONS  (PRN):  diphenhydrAMINE 25 milliGRAM(s) Oral every 6 hours PRN Pruritus  lanolin Ointment 1 Application(s) Topical every 6 hours PRN Sore Nipples  magnesium hydroxide Suspension 30 milliLiter(s) Oral two times a day PRN Constipation  oxyCODONE    IR 5 milliGRAM(s) Oral once PRN Moderate to Severe Pain (4-10)  oxyCODONE    IR 5 milliGRAM(s) Oral every 3 hours PRN Moderate to Severe Pain (4-10)  oxyCODONE    IR 5 milliGRAM(s) Oral once PRN Moderate to Severe Pain (4-10)  senna 1 Tablet(s) Oral two times a day PRN Constipation  simethicone 80 milliGRAM(s) Chew every 4 hours PRN Gas      LABS:                        8.8    14.71 )-----------( 242      ( 2025 09:23 )             26.6         136  |  103  |  11  ----------------------------<  93  4.3   |  21[L]  |  0.65    Ca    8.4      2025 09:23    TPro  5.6[L]  /  Alb  2.9[L]  /  TBili  0.4  /  DBili  x   /  AST  34[H]  /  ALT  26  /  AlkPhos  156[H]  04-      Urinalysis Basic - ( 2025 09:23 )    Color: x / Appearance: x / SG: x / pH: x  Gluc: 93 mg/dL / Ketone: x  / Bili: x / Urobili: x   Blood: x / Protein: x / Nitrite: x   Leuk Esterase: x / RBC: x / WBC x   Sq Epi: x / Non Sq Epi: x / Bacteria: x        RADIOLOGY & ADDITIONAL TESTS: Reviewed.      TTE W or WO Ultrasound Enhancing Agent (25 @ 10:19)     1. Left ventricular cavity is normal in size. Left ventricular wall thickness is normal. Left ventricular systolic function is normal with an ejection fraction of 56 % by 3D. There are no regional wall motion abnormalities seen.   2. Normal left ventricular diastolic function.   3. Left ventricular globallongitudinal strain is -19.0 % which is normal (< -18%). Images were acquired on a [employer] ultrasound system and processed on the ultrasound machine with a heart rate of 82 bpm and a blood pressure of 138/93 mmHg.   4. Normal right ventricular cavity size and normal right ventricular systolic function. Tricuspid annular plane systolic excursion (TAPSE) is 2.0 cm (normal >=1.7 cm).   5. Structurally normal mitral valve with normal leaflet excursion. There is trace mitral regurgitation.   6. The aortic valve appears trileaflet with normal systolic excursion. There is mild aortic regurgitation.            23 yo F with PMHx migraine presenting with severe range blood pressures, post op day #5 from  for PEC. Pressure has been difficult to control, necessitating multiple IVP's of Labetalol as well as Nifedipine IR. She has been on Nifedipine 90 mg daily for three days, Labetalol increased from 200 tid to 400 TID. She denies shortness of breath, palpitations, chest pressure, headache, change in vision. Admits that she is anxious. States that she feels that her BPs have never been "normal" even before pregnancy.      Neuro:  -A&Ox3    Resp:  -Satting well on RA      Cardiac:   #Pre-eclampsia   - Left ventricular cavity is normal in size. Left ventricular wall thickness is normal. Left ventricular systolic function is normal with an ejection fraction of 56 % by 3D  - Increase labetalol 600 PO TID  - Continue Procardia 60mg PO am and Procardia 30mg PO daily   - Add hydrazine 25mg PO TID   - B/p goal SBP<130   - Start Cardene gtt if B/P remains elevated at 6pm (d/c Procardia once gtt started)    GI:  - Continue diet     :  - Scr WNL     Heme:  #anemia  - H&H trend as above  - s/p 2uPRBCs with adequate rise in H&H    #Postpartum  - Continue with po analgesia  - Continue regular diet  - Incision site c/d/i    DVT PPx:  - HSQ

## 2025-04-03 NOTE — PATIENT PROFILE ADULT - FALL HARM RISK - UNIVERSAL INTERVENTIONS
Bed in lowest position, wheels locked, appropriate side rails in place/Call bell, personal items and telephone in reach/Instruct patient to call for assistance before getting out of bed or chair/Non-slip footwear when patient is out of bed/[place] to call system/Physically safe environment - no spills, clutter or unnecessary equipment/Purposeful Proactive Rounding/Room/bathroom lighting operational, light cord in reach

## 2025-04-03 NOTE — PATIENT PROFILE ADULT - NSPROMEDSBROUGHTTOHOSP_GEN_A_NUR
Referring Provider: JEFRY Mixon    JEFRY Wild  [facility]  Office: [phone]           no

## 2025-04-03 NOTE — CHART NOTE - NSCHARTNOTEFT_GEN_A_CORE
Patient seen during Echo. Asymptomatic. BPs improved. TTE normal  If BP < 140/90, would DC, on Nifedipine 90 mg daily and Labetalol 500 TID, please give 100 mg tablets. Patient understands she will be taking 5 pills three times daily which will be titrated by myself. She will be in contact with me this weekend, for titration of medications.   Will follow up as outpt with me next week.   Patient and Mother in agreement.

## 2025-04-03 NOTE — PROVIDER CONTACT NOTE (OTHER) - ASSESSMENT
Patient had elevated BP & HR. See flowsheets. Patient denies headache and blurry vision.
Post-op day #5, patient has history of preeclampsia with severe features. s/p mag from 3/29/2025 to  3/30/2025. patient blood pressure at 1420= 169/106, HR= 92. denies HA, abd pain, visual disturbances, N&v.
Patient denies chest pain, shortness of breath, headache, bleeding WNL.  Lyin/102, HR: 122, Sittin/87, HR: 115, Standin/92, HR: 148
Patient denies dizziness, chest pain, shortness of breath, blurry vision, headache, bleeding WNL.   @21:13 BP: 143/100.
Patient denies chest pain, shortness of breath, headache, bleeding WNL.  See flowsheet for vitals
Patient denies shortness of breath, dizziness, chest pain, blurry vision, and bleeding WNL.

## 2025-04-03 NOTE — PROVIDER CONTACT NOTE (OTHER) - ACTION/TREATMENT ORDERED:
Per MD, push IV Labetalol 20mg and recheck patient's vitals in 10 minutes.
MD Ayala made aware of orthostatics and elevated BP. No new orders at this time. Ask patient to continue to hydrate and do repeat orthos later this morning.
MD Bower made aware of orthostatics. No new orders at this time. As per MD Bower, "let patient have breakfast and PO hydrate, and reassess in the morning".
Per Md, she will order 60mg and 30mg dose of Procardia.
MD Bower made aware. As per MD Bower, repeat BP in 15 minutes.
will recheck blood pressure in 15 minutes and report findings.
MD Bower made aware of elevated BP. No new orders at this time.

## 2025-04-03 NOTE — PATIENT PROFILE ADULT - HAVE YOU BEEN EATING POORLY BECAUSE OF A DECREASED APPETITE?
This script is being sent to [facility]# [phone]  I spoke with Sun Lopez and [facility]   No (0)

## 2025-04-03 NOTE — PROVIDER CONTACT NOTE (OTHER) - REASON
Elevated BP: 161/105
Elevated BP of 143/100
elevated blood pressure
Elevated pressures overnight and elevated BP and ortho at 5am
Elevated pressures and elevated HR at 9am
Orthostatic BP & Tachycardia
Pt prefers to take procardia 60mg and 30mg dose. Does not want 90mg dose

## 2025-04-03 NOTE — PROVIDER CONTACT NOTE (OTHER) - RECOMMENDATIONS
Patient needs IV Push blood pressure medication.
recheck blood pressure in 15 minutes. call back with findings. scheduled procardia of 90 mg given.
Increase medication and repeat orthos

## 2025-04-03 NOTE — PROVIDER CONTACT NOTE (OTHER) - SITUATION
Phone call to pt, message left for pt to return call. Please relay message below.  
patient has history of preeclampsia with severe features. s/p mag from 3/29/2025 to  3/30/2025. patient blood pressure at 1420= 169/106, HR= 92. denies HA, abd pain, visual disturbances, N&v.
Patient had an elevated BP of 143/100.
RN gave pt 90mg procardia XL at 1300. Pt said she spit it out because she cannot swallow it. Pt is requesting for a 60mg and 30mg dose.
Patient had elevated BP & HR during orthostatics.
During orthostatics, patient's BP lying down was 161/105.
Patient had elevated BP & HR during orthostatics.
Patient had elevated BP & HR. See flowsheets. First BP was 152/101 . 15 minutes later, patient's BP was 145/110. .

## 2025-04-03 NOTE — PROVIDER CONTACT NOTE (OTHER) - BACKGROUND
Primary c/s from 3/29 @22:10. QBL: 476. History of anxiety.
Primary c/s from 3/29 @22:10. QBL: 476. History of anxiety.
Primary c/s from 3/29 @22:10. QBL: 476. History of anxiety. Pt is receiving procardia 90XL daily and labetalol 200 BID
Primary c/s from 3/29 @22:10. QBL: 476. History of anxiety.
day #5, patient has history of preeclampsia with severe features. s/p mag from 3/29/2025 to  3/30/2025. patient blood pressure at 1420= 169/106, HR= 92. denies HA, abd pain, visual disturbances, N&v.

## 2025-04-03 NOTE — CHART NOTE - NSCHARTNOTEFT_GEN_A_CORE
*incomplete note ctobe finalized    Assessed patient at beside due to call from RN that she is having severe range blood pressures. Patient is now POD5 s/p pLTCS c/b preeclampsia with severe feature s/p magnesium from 3/29-30. Blood pressures have been persistently elevated and have required several uptitrations of medications and short acting antihypertensive medications. Currently patient is prescribed Procardia 90XL daily and labetalol 500 TID. Cardiology OB was consulted on 4/1 and has been following.    This afternoon patient had a blood pressure of 169/106 and repeat 15 minutes later was 166/105 at which point Procardia 10 IR was administered. BP check 20 minutes later was 164/112 and an additional Procardia 10 IR was administered. At this point I called Dr. Leonard of  [facility] asking for further recommendations. She recommended admission to the CCU for further management of blood pressures. Evaluated patient at bedside with Dr. Aj to discuss plan of going to  CCU. Patient continues to decline any symptoms of headache, vision changes, CP, SOB, heart palpitations or any other concerning symptoms. Assessed patient at beside due to call from RN that she is having severe range blood pressures. Patient is now POD5 s/p pLTCS c/b preeclampsia with severe feature s/p magnesium from 3/29-30. Blood pressures have been persistently elevated and have required several uptitrations of medications and short acting antihypertensive medications. Currently patient is prescribed Procardia 90XL daily and labetalol 500 TID. Cardiology OB was consulted on 4/1 and has been following.    This afternoon patient had a blood pressure of 169/106 and repeat 15 minutes later was 166/105 at which point Procardia 10 IR was administered. BP check 20 minutes later was 164/112 and an additional Procardia 10 IR was administered. She also got her scheduled dose of labetalol 500 mg. At this point I called Dr. Leonard of [facility] asking for further recommendations. She recommended admission to the CCU for further management of blood pressures. Evaluated patient at bedside with Dr. Aj, Cardiology fellow, to discuss plan of going to CCU. Patient continues to decline any symptoms of headache, vision changes, CP, SOB, heart palpitations or any other concerning symptoms. Repeat blood pressure after second dose of procardia 10 IR was 171/118 with  bpm. Dr. Aj recommended administration of 25 mg hydralazine PO. Dr. Mccormack went to evaluate patient at bedside as well and repeat blood pressure at his evaluation was 131/78 with HR 70. Per Dr. Mccormack, PO hydralazine 25 mg was held due to no longer being in severe range BPs. Plan for patient to transfer to CCU for further management of blood pressures.     Tanya Ayala PGY1  D/w and also evaluated by Dr. Mccormack, attending

## 2025-04-03 NOTE — PROVIDER CONTACT NOTE (OTHER) - NAME OF MD/NP/PA/DO NOTIFIED:
Debo Ayala MD
MD Bower
Debo Ayala MD
Dr. Lonnie Crandall,
MD Bower
MD Bower
Dr. Ayala / Dr. Mccormack

## 2025-04-03 NOTE — PROGRESS NOTE ADULT - SUBJECTIVE AND OBJECTIVE BOX
R1 Progress Note    Patient seen and examined at bedside. Overnight pt Hb noted to be 6.3, pt ordered for 2 uPRBCs with second unit hanging at bedside. No acute complaints, pain well controlled. Patient is ambulating and tolerating regular diet. Pt is passing flatus and voiding spontaneously. Denies fever, chills, CP, SOB, N/V, HA, blurred vision.     Vital Signs Last 24 Hours  T(C): 36.8 (04-03-25 @ 05:05), Max: 37.4 (04-02-25 @ 22:30)  HR: 103 (04-03-25 @ 05:05) (94 - 103)  BP: 128/90 (04-03-25 @ 05:05) (128/90 - 157/103)  RR: 20 (04-03-25 @ 05:05) (17 - 20)  SpO2: 100% (04-03-25 @ 05:05) (99% - 100%)    I&O's Summary      Physical Exam:  General: NAD  Abdomen: Soft, non-tender, non-distended, fundus firm  Incision: Pfannenstiel incision CDI, subcuticular suture closure  Pelvic: Lochia wnl      Labs:    Blood Type: A Positive  Antibody Screen: Negative  RPR: Negative               8.8    14.71 )-----------( 242      ( 04-02 @ 09:23 )             26.6                6.3    14.26 )-----------( 195      ( 04-01 @ 17:41 )             19.3                7.1    17.39 )-----------( 246      ( 03-31 @ 05:00 )             22.3         MEDICATIONS  (STANDING):  acetaminophen   Oral Liquid .. 975 milliGRAM(s) Oral every 6 hours  ascorbic acid 500 milliGRAM(s) Oral daily  ferrous    sulfate 325 milliGRAM(s) Oral three times a day  heparin   Injectable 5000 Unit(s) SubCutaneous every 12 hours  ibuprofen  Suspension. 600 milliGRAM(s) Oral every 6 hours  labetalol 500 milliGRAM(s) Oral every 8 hours  NIFEdipine XL 30 milliGRAM(s) Oral daily  NIFEdipine XL 60 milliGRAM(s) Oral daily    MEDICATIONS  (PRN):  diphenhydrAMINE 25 milliGRAM(s) Oral every 6 hours PRN Pruritus  lanolin Ointment 1 Application(s) Topical every 6 hours PRN Sore Nipples  magnesium hydroxide Suspension 30 milliLiter(s) Oral two times a day PRN Constipation  oxyCODONE    IR 5 milliGRAM(s) Oral once PRN Moderate to Severe Pain (4-10)  oxyCODONE    IR 5 milliGRAM(s) Oral every 3 hours PRN Moderate to Severe Pain (4-10)  oxyCODONE    IR 5 milliGRAM(s) Oral once PRN Moderate to Severe Pain (4-10)  senna 1 Tablet(s) Oral two times a day PRN Constipation  simethicone 80 milliGRAM(s) Chew every 4 hours PRN Gas

## 2025-04-03 NOTE — PATIENT PROFILE ADULT - NSPROIMPLANTSMEDDEV_GEN_A_NUR
Erythromycin Pregnancy And Lactation Text: This medication is Pregnancy Category B and is considered safe during pregnancy. It is also excreted in breast milk. Topical Clindamycin Counseling: Patient counseled that this medication may cause skin irritation or allergic reactions.  In the event of skin irritation, the patient was advised to reduce the amount of the drug applied or use it less frequently.   The patient verbalized understanding of the proper use and possible adverse effects of clindamycin.  All of the patient's questions and concerns were addressed. Dapsone Counseling: I discussed with the patient the risks of dapsone including but not limited to hemolytic anemia, agranulocytosis, rashes, methemoglobinemia, kidney failure, peripheral neuropathy, headaches, GI upset, and liver toxicity.  Patients who start dapsone require monitoring including baseline LFTs and weekly CBCs for the first month, then every month thereafter.  The patient verbalized understanding of the proper use and possible adverse effects of dapsone.  All of the patient's questions and concerns were addressed. Use Enhanced Medication Counseling?: No Birth Control Pills Counseling: Birth Control Pill Counseling: I discussed with the patient the potential side effects of OCPs including but not limited to increased risk of stroke, heart attack, thrombophlebitis, deep venous thrombosis, hepatic adenomas, breast changes, GI upset, headaches, and depression.  The patient verbalized understanding of the proper use and possible adverse effects of OCPs. All of the patient's questions and concerns were addressed. Azelaic Acid Pregnancy And Lactation Text: This medication is considered safe during pregnancy and breast feeding. Isotretinoin Pregnancy And Lactation Text: This medication is Pregnancy Category X and is considered extremely dangerous during pregnancy. It is unknown if it is excreted in breast milk. Spironolactone Counseling: Patient advised regarding risks of diarrhea, abdominal pain, hyperkalemia, birth defects (for female patients), liver toxicity and renal toxicity. The patient may need blood work to monitor liver and kidney function and potassium levels while on therapy. The patient verbalized understanding of the proper use and possible adverse effects of spironolactone.  All of the patient's questions and concerns were addressed. Topical Sulfur Applications Counseling: Topical Sulfur Counseling: Patient counseled that this medication may cause skin irritation or allergic reactions.  In the event of skin irritation, the patient was advised to reduce the amount of the drug applied or use it less frequently.   The patient verbalized understanding of the proper use and possible adverse effects of topical sulfur application.  All of the patient's questions and concerns were addressed. Detail Level: Zone Benzoyl Peroxide Pregnancy And Lactation Text: This medication is Pregnancy Category C. It is unknown if benzoyl peroxide is excreted in breast milk. Tetracycline Counseling: Patient counseled regarding possible photosensitivity and increased risk for sunburn.  Patient instructed to avoid sunlight, if possible.  When exposed to sunlight, patients should wear protective clothing, sunglasses, and sunscreen.  The patient was instructed to call the office immediately if the following severe adverse effects occur:  hearing changes, easy bruising/bleeding, severe headache, or vision changes.  The patient verbalized understanding of the proper use and possible adverse effects of tetracycline.  All of the patient's questions and concerns were addressed. Patient understands to avoid pregnancy while on therapy due to potential birth defects. Tetracycline Pregnancy And Lactation Text: This medication is Pregnancy Category D and not consider safe during pregnancy. It is also excreted in breast milk. Topical Retinoid counseling:  Patient advised to apply a pea-sized amount only at bedtime and wait 30 minutes after washing their face before applying.  If too drying, patient may add a non-comedogenic moisturizer. The patient verbalized understanding of the proper use and possible adverse effects of retinoids.  All of the patient's questions and concerns were addressed. None Topical Sulfur Applications Pregnancy And Lactation Text: This medication is Pregnancy Category C and has an unknown safety profile during pregnancy. It is unknown if this topical medication is excreted in breast milk. Azithromycin Counseling:  I discussed with the patient the risks of azithromycin including but not limited to GI upset, allergic reaction, drug rash, diarrhea, and yeast infections. Winlevi Pregnancy And Lactation Text: This medication is considered safe during pregnancy and breastfeeding. Minocycline Counseling: Patient advised regarding possible photosensitivity and discoloration of the teeth, skin, lips, tongue and gums.  Patient instructed to avoid sunlight, if possible.  When exposed to sunlight, patients should wear protective clothing, sunglasses, and sunscreen.  The patient was instructed to call the office immediately if the following severe adverse effects occur:  hearing changes, easy bruising/bleeding, severe headache, or vision changes.  The patient verbalized understanding of the proper use and possible adverse effects of minocycline.  All of the patient's questions and concerns were addressed. Doxycycline Pregnancy And Lactation Text: This medication is Pregnancy Category D and not consider safe during pregnancy. It is also excreted in breast milk but is considered safe for shorter treatment courses. Bactrim Counseling:  I discussed with the patient the risks of sulfa antibiotics including but not limited to GI upset, allergic reaction, drug rash, diarrhea, dizziness, photosensitivity, and yeast infections.  Rarely, more serious reactions can occur including but not limited to aplastic anemia, agranulocytosis, methemoglobinemia, blood dyscrasias, liver or kidney failure, lung infiltrates or desquamative/blistering drug rashes. Aklief Pregnancy And Lactation Text: It is unknown if this medication is safe to use during pregnancy.  It is unknown if this medication is excreted in breast milk.  Breastfeeding women should use the topical cream on the smallest area of the skin for the shortest time needed while breastfeeding.  Do not apply to nipple and areola. Tazorac Counseling:  Patient advised that medication is irritating and drying.  Patient may need to apply sparingly and wash off after an hour before eventually leaving it on overnight.  The patient verbalized understanding of the proper use and possible adverse effects of tazorac.  All of the patient's questions and concerns were addressed. Sarecycline Counseling: Patient advised regarding possible photosensitivity and discoloration of the teeth, skin, lips, tongue and gums.  Patient instructed to avoid sunlight, if possible.  When exposed to sunlight, patients should wear protective clothing, sunglasses, and sunscreen.  The patient was instructed to call the office immediately if the following severe adverse effects occur:  hearing changes, easy bruising/bleeding, severe headache, or vision changes.  The patient verbalized understanding of the proper use and possible adverse effects of sarecycline.  All of the patient's questions and concerns were addressed. Tazorac Pregnancy And Lactation Text: This medication is not safe during pregnancy. It is unknown if this medication is excreted in breast milk. Azelaic Acid Counseling: Patient counseled that medicine may cause skin irritation and to avoid applying near the eyes.  In the event of skin irritation, the patient was advised to reduce the amount of the drug applied or use it less frequently.   The patient verbalized understanding of the proper use and possible adverse effects of azelaic acid.  All of the patient's questions and concerns were addressed. Isotretinoin Counseling: Patient should get monthly blood tests, not donate blood, not drive at night if vision affected, not share medication, and not undergo elective surgery for 6 months after tx completed. Side effects reviewed, pt to contact office should one occur. Benzoyl Peroxide Counseling: Patient counseled that medicine may cause skin irritation and bleach clothing.  In the event of skin irritation, the patient was advised to reduce the amount of the drug applied or use it less frequently.   The patient verbalized understanding of the proper use and possible adverse effects of benzoyl peroxide.  All of the patient's questions and concerns were addressed. Birth Control Pills Pregnancy And Lactation Text: This medication should be avoided if pregnant and for the first 30 days post-partum. High Dose Vitamin A Counseling: Side effects reviewed, pt to contact office should one occur. Spironolactone Pregnancy And Lactation Text: This medication can cause feminization of the male fetus and should be avoided during pregnancy. The active metabolite is also found in breast milk. Topical Clindamycin Pregnancy And Lactation Text: This medication is Pregnancy Category B and is considered safe during pregnancy. It is unknown if it is excreted in breast milk. Dapsone Pregnancy And Lactation Text: This medication is Pregnancy Category C and is not considered safe during pregnancy or breast feeding. High Dose Vitamin A Pregnancy And Lactation Text: High dose vitamin A therapy is contraindicated during pregnancy and breast feeding. Doxycycline Counseling:  Patient counseled regarding possible photosensitivity and increased risk for sunburn.  Patient instructed to avoid sunlight, if possible.  When exposed to sunlight, patients should wear protective clothing, sunglasses, and sunscreen.  The patient was instructed to call the office immediately if the following severe adverse effects occur:  hearing changes, easy bruising/bleeding, severe headache, or vision changes.  The patient verbalized understanding of the proper use and possible adverse effects of doxycycline.  All of the patient's questions and concerns were addressed. Azithromycin Pregnancy And Lactation Text: This medication is considered safe during pregnancy and is also secreted in breast milk. Bactrim Pregnancy And Lactation Text: This medication is Pregnancy Category D and is known to cause fetal risk.  It is also excreted in breast milk. Topical Retinoid Pregnancy And Lactation Text: This medication is Pregnancy Category C. It is unknown if this medication is excreted in breast milk. Aklief counseling:  Patient advised to apply a pea-sized amount only at bedtime and wait 30 minutes after washing their face before applying.  If too drying, patient may add a non-comedogenic moisturizer.  The most commonly reported side effects including irritation, redness, scaling, dryness, stinging, burning, itching, and increased risk of sunburn.  The patient verbalized understanding of the proper use and possible adverse effects of retinoids.  All of the patient's questions and concerns were addressed. Erythromycin Counseling:  I discussed with the patient the risks of erythromycin including but not limited to GI upset, allergic reaction, drug rash, diarrhea, increase in liver enzymes, and yeast infections. Winlevi Counseling:  I discussed with the patient the risks of topical clascoterone including but not limited to erythema, scaling, itching, and stinging. Patient voiced their understanding.

## 2025-04-04 ENCOUNTER — APPOINTMENT (OUTPATIENT)
Dept: ANTEPARTUM | Facility: CLINIC | Age: 25
End: 2025-04-04

## 2025-04-04 LAB
ALBUMIN SERPL ELPH-MCNC: 3 G/DL — LOW (ref 3.3–5)
ALP SERPL-CCNC: 143 U/L — HIGH (ref 40–120)
ALT FLD-CCNC: 20 U/L — SIGNIFICANT CHANGE UP (ref 4–33)
ANION GAP SERPL CALC-SCNC: 16 MMOL/L — HIGH (ref 7–14)
AST SERPL-CCNC: 19 U/L — SIGNIFICANT CHANGE UP (ref 4–32)
BILIRUB SERPL-MCNC: 0.3 MG/DL — SIGNIFICANT CHANGE UP (ref 0.2–1.2)
BUN SERPL-MCNC: 21 MG/DL — SIGNIFICANT CHANGE UP (ref 7–23)
CALCIUM SERPL-MCNC: 8.2 MG/DL — LOW (ref 8.4–10.5)
CHLORIDE SERPL-SCNC: 105 MMOL/L — SIGNIFICANT CHANGE UP (ref 98–107)
CO2 SERPL-SCNC: 16 MMOL/L — LOW (ref 22–31)
CREAT SERPL-MCNC: 0.59 MG/DL — SIGNIFICANT CHANGE UP (ref 0.5–1.3)
EGFR: 129 ML/MIN/1.73M2 — SIGNIFICANT CHANGE UP
EGFR: 129 ML/MIN/1.73M2 — SIGNIFICANT CHANGE UP
GLUCOSE SERPL-MCNC: 111 MG/DL — HIGH (ref 70–99)
HCT VFR BLD CALC: 27.5 % — LOW (ref 34.5–45)
HGB BLD-MCNC: 8.9 G/DL — LOW (ref 11.5–15.5)
MAGNESIUM SERPL-MCNC: 2.1 MG/DL — SIGNIFICANT CHANGE UP (ref 1.6–2.6)
MCHC RBC-ENTMCNC: 28.4 PG — SIGNIFICANT CHANGE UP (ref 27–34)
MCHC RBC-ENTMCNC: 32.4 G/DL — SIGNIFICANT CHANGE UP (ref 32–36)
MCV RBC AUTO: 87.9 FL — SIGNIFICANT CHANGE UP (ref 80–100)
MRSA PCR RESULT.: SIGNIFICANT CHANGE UP
NRBC # BLD AUTO: 0 K/UL — SIGNIFICANT CHANGE UP (ref 0–0)
NRBC # FLD: 0 K/UL — SIGNIFICANT CHANGE UP (ref 0–0)
NRBC BLD AUTO-RTO: 0 /100 WBCS — SIGNIFICANT CHANGE UP (ref 0–0)
PHOSPHATE SERPL-MCNC: 3.2 MG/DL — SIGNIFICANT CHANGE UP (ref 2.5–4.5)
PLATELET # BLD AUTO: 282 K/UL — SIGNIFICANT CHANGE UP (ref 150–400)
POTASSIUM SERPL-MCNC: 4 MMOL/L — SIGNIFICANT CHANGE UP (ref 3.5–5.3)
POTASSIUM SERPL-SCNC: 4 MMOL/L — SIGNIFICANT CHANGE UP (ref 3.5–5.3)
PROT SERPL-MCNC: 5.6 G/DL — LOW (ref 6–8.3)
RBC # BLD: 3.13 M/UL — LOW (ref 3.8–5.2)
RBC # FLD: 14 % — SIGNIFICANT CHANGE UP (ref 10.3–14.5)
S AUREUS DNA NOSE QL NAA+PROBE: SIGNIFICANT CHANGE UP
SODIUM SERPL-SCNC: 137 MMOL/L — SIGNIFICANT CHANGE UP (ref 135–145)
WBC # BLD: 14.37 K/UL — HIGH (ref 3.8–10.5)
WBC # FLD AUTO: 14.37 K/UL — HIGH (ref 3.8–10.5)

## 2025-04-04 PROCEDURE — 99291 CRITICAL CARE FIRST HOUR: CPT

## 2025-04-04 RX ORDER — NIFEDIPINE 30 MG
30 TABLET, EXTENDED RELEASE 24 HR ORAL ONCE
Refills: 0 | Status: DISCONTINUED | OUTPATIENT
Start: 2025-04-04 | End: 2025-04-04

## 2025-04-04 RX ORDER — NIFEDIPINE 30 MG
60 TABLET, EXTENDED RELEASE 24 HR ORAL
Refills: 0 | Status: DISCONTINUED | OUTPATIENT
Start: 2025-04-04 | End: 2025-04-06

## 2025-04-04 RX ORDER — NIFEDIPINE 30 MG
30 TABLET, EXTENDED RELEASE 24 HR ORAL ONCE
Refills: 0 | Status: COMPLETED | OUTPATIENT
Start: 2025-04-04 | End: 2025-04-04

## 2025-04-04 RX ADMIN — LABETALOL HYDROCHLORIDE 600 MILLIGRAM(S): 200 TABLET, FILM COATED ORAL at 05:56

## 2025-04-04 RX ADMIN — Medication 975 MILLIGRAM(S): at 18:11

## 2025-04-04 RX ADMIN — Medication 30 MILLIGRAM(S): at 05:07

## 2025-04-04 RX ADMIN — Medication 600 MILLIGRAM(S): at 05:06

## 2025-04-04 RX ADMIN — Medication 25 MILLIGRAM(S): at 11:13

## 2025-04-04 RX ADMIN — Medication 600 MILLIGRAM(S): at 14:41

## 2025-04-04 RX ADMIN — HEPARIN SODIUM 5000 UNIT(S): 1000 INJECTION INTRAVENOUS; SUBCUTANEOUS at 05:07

## 2025-04-04 RX ADMIN — Medication 500 MILLIGRAM(S): at 11:45

## 2025-04-04 RX ADMIN — Medication 600 MILLIGRAM(S): at 22:08

## 2025-04-04 RX ADMIN — Medication 975 MILLIGRAM(S): at 08:33

## 2025-04-04 RX ADMIN — Medication 1 APPLICATION(S): at 05:56

## 2025-04-04 RX ADMIN — Medication 600 MILLIGRAM(S): at 22:50

## 2025-04-04 RX ADMIN — Medication 600 MILLIGRAM(S): at 13:43

## 2025-04-04 RX ADMIN — Medication 50 MILLIGRAM(S): at 22:06

## 2025-04-04 RX ADMIN — LABETALOL HYDROCHLORIDE 600 MILLIGRAM(S): 200 TABLET, FILM COATED ORAL at 22:52

## 2025-04-04 RX ADMIN — Medication 30 MILLIGRAM(S): at 11:13

## 2025-04-04 RX ADMIN — Medication 60 MILLIGRAM(S): at 22:05

## 2025-04-04 RX ADMIN — Medication 325 MILLIGRAM(S): at 14:10

## 2025-04-04 RX ADMIN — Medication 50 MILLIGRAM(S): at 14:09

## 2025-04-04 RX ADMIN — Medication 975 MILLIGRAM(S): at 18:59

## 2025-04-04 RX ADMIN — HEPARIN SODIUM 5000 UNIT(S): 1000 INJECTION INTRAVENOUS; SUBCUTANEOUS at 22:03

## 2025-04-04 RX ADMIN — Medication 25 MILLIGRAM(S): at 05:07

## 2025-04-04 RX ADMIN — LABETALOL HYDROCHLORIDE 600 MILLIGRAM(S): 200 TABLET, FILM COATED ORAL at 15:08

## 2025-04-04 RX ADMIN — Medication 325 MILLIGRAM(S): at 22:03

## 2025-04-04 RX ADMIN — Medication 325 MILLIGRAM(S): at 05:07

## 2025-04-04 NOTE — PROGRESS NOTE ADULT - SUBJECTIVE AND OBJECTIVE BOX
Subjective and objective:  Overnight events:  Tele event:      MEDICATIONS  (STANDING):  acetaminophen   Oral Liquid .. 975 milliGRAM(s) Oral every 6 hours  ascorbic acid 500 milliGRAM(s) Oral daily  chlorhexidine 2% Cloths 1 Application(s) Topical <User Schedule>  ferrous    sulfate 325 milliGRAM(s) Oral three times a day  heparin   Injectable 5000 Unit(s) SubCutaneous every 12 hours  hydrALAZINE 25 milliGRAM(s) Oral three times a day  ibuprofen  Suspension. 600 milliGRAM(s) Oral every 6 hours  labetalol 600 milliGRAM(s) Oral three times a day  NIFEdipine XL 60 milliGRAM(s) Oral daily  NIFEdipine XL 30 milliGRAM(s) Oral daily    MEDICATIONS  (PRN):  diphenhydrAMINE 25 milliGRAM(s) Oral every 6 hours PRN Pruritus  lanolin Ointment 1 Application(s) Topical every 6 hours PRN Sore Nipples  magnesium hydroxide Suspension 30 milliLiter(s) Oral two times a day PRN Constipation  oxyCODONE    IR 5 milliGRAM(s) Oral once PRN Moderate to Severe Pain (4-10)  oxyCODONE    IR 5 milliGRAM(s) Oral once PRN Moderate to Severe Pain (4-10)  oxyCODONE    IR 5 milliGRAM(s) Oral every 3 hours PRN Moderate to Severe Pain (4-10)  senna 1 Tablet(s) Oral two times a day PRN Constipation  simethicone 80 milliGRAM(s) Chew every 4 hours PRN Gas        Vital Signs Last 24 Hrs  T(C): 37.1 (04 Apr 2025 06:00), Max: 37.1 (04 Apr 2025 06:00)  T(F): 98.8 (04 Apr 2025 06:00), Max: 98.8 (04 Apr 2025 06:00)  HR: 86 (04 Apr 2025 08:00) (70 - 103)  BP: 154/113 (04 Apr 2025 08:00) (124/99 - 173/106)  BP(mean): 125 (04 Apr 2025 08:00) (102 - 125)  RR: 14 (03 Apr 2025 21:00) (14 - 24)  SpO2: 100% (04 Apr 2025 08:00) (96% - 100%)    Parameters below as of 03 Apr 2025 14:20  Patient On (Oxygen Delivery Method): room air      I&O's Detail      Physical Exam:   Constitutional: well appearing  Neck: supple, no JVD  Respiratory: clear breath sounds bilaterally,  no use of extra accessory muscles.  Cardiovascular: RRR, normal S1, S2, no gallops, or murmurs.   Gastrointestinal: soft, non-tender, non-distended, normal bowel sounds,  Extremities: no edema, no clubbing, no cyanosis  Vascular: palpable peripheral pulses, no cyanosis,  Neurological: Awake, follows commands  Skin: no skin lesions, no rash, no ulceration                            8.9    14.37 )-----------( 282      ( 04 Apr 2025 05:25 )             27.5       04 Apr 2025 05:25    137    |  105    |  21     ----------------------------<  111[H]  4.0     |  16[L]  |  0.59   02 Apr 2025 09:23    136    |  103    |  11     ----------------------------<  93     4.3     |  21[L]  |  0.65     Ca    8.2[L]      04 Apr 2025 05:25  Ca    8.4        02 Apr 2025 09:23  Phos  3.2       04 Apr 2025 05:25  Mg     2.10      04 Apr 2025 05:25    TPro  5.6[L]  /  Alb  3.0[L]  /  TBili  0.3    /  DBili  x      /  AST  19     /  ALT  20     /  AlkPhos  143[H]  04 Apr 2025 05:25  TPro  5.6[L]  /  Alb  2.9[L]  /  TBili  0.4    /  DBili  x      /  AST  34[H]  /  ALT  26     /  AlkPhos  156[H]  02 Apr 2025 09:23      < from: TTE W or WO Ultrasound Enhancing Agent (04.03.25 @ 10:19) >     CONCLUSIONS:      1. Left ventricular cavity is normal in size. Left ventricular wall thickness is normal. Left ventricular systolic function is normal with an ejection fraction of 56 % by 3D. There are no regional wall motion abnormalities seen.   2. Normal left ventricular diastolic function.   3. Left ventricular globallongitudinal strain is -19.0 % which is normal (< -18%). Images were acquired on a [employer] ultrasound system and processed on the ultrasound machine with a heart rate of 82 bpm and a blood pressure of 138/93 mmHg.   4. Normal right ventricular cavity size and normal right ventricular systolic function. Tricuspid annular plane systolic excursion (TAPSE) is 2.0 cm (normal >=1.7 cm).   5. Structurally normal mitral valve with normal leaflet excursion. There is trace mitral regurgitation.   6. The aortic valve appears trileaflet with normal systolic excursion. There is mild aortic regurgitation.    < end of copied text >                         Subjective and objective:  Overnight events: Denies CP, or SOB, c/o abd incisional pain, relieved with tylenol.   Tele event: SR, VR 80's , no ectopy      MEDICATIONS  (STANDING):  acetaminophen   Oral Liquid .. 975 milliGRAM(s) Oral every 6 hours  ascorbic acid 500 milliGRAM(s) Oral daily  chlorhexidine 2% Cloths 1 Application(s) Topical <User Schedule>  ferrous    sulfate 325 milliGRAM(s) Oral three times a day  heparin   Injectable 5000 Unit(s) SubCutaneous every 12 hours  hydrALAZINE 25 milliGRAM(s) Oral three times a day  ibuprofen  Suspension. 600 milliGRAM(s) Oral every 6 hours  labetalol 600 milliGRAM(s) Oral three times a day  NIFEdipine XL 60 milliGRAM(s) Oral daily  NIFEdipine XL 30 milliGRAM(s) Oral daily    MEDICATIONS  (PRN):  diphenhydrAMINE 25 milliGRAM(s) Oral every 6 hours PRN Pruritus  lanolin Ointment 1 Application(s) Topical every 6 hours PRN Sore Nipples  magnesium hydroxide Suspension 30 milliLiter(s) Oral two times a day PRN Constipation  oxyCODONE    IR 5 milliGRAM(s) Oral once PRN Moderate to Severe Pain (4-10)  oxyCODONE    IR 5 milliGRAM(s) Oral once PRN Moderate to Severe Pain (4-10)  oxyCODONE    IR 5 milliGRAM(s) Oral every 3 hours PRN Moderate to Severe Pain (4-10)  senna 1 Tablet(s) Oral two times a day PRN Constipation  simethicone 80 milliGRAM(s) Chew every 4 hours PRN Gas        Vital Signs Last 24 Hrs  T(C): 37.1 (04 Apr 2025 06:00), Max: 37.1 (04 Apr 2025 06:00)  T(F): 98.8 (04 Apr 2025 06:00), Max: 98.8 (04 Apr 2025 06:00)  HR: 86 (04 Apr 2025 08:00) (70 - 103)  BP: 154/113 (04 Apr 2025 08:00) (124/99 - 173/106)  BP(mean): 125 (04 Apr 2025 08:00) (102 - 125)  RR: 14 (03 Apr 2025 21:00) (14 - 24)  SpO2: 100% (04 Apr 2025 08:00) (96% - 100%)    Parameters below as of 03 Apr 2025 14:20  Patient On (Oxygen Delivery Method): room air      I&O's Detail      Physical Exam:   Constitutional: well appearing  Neck: supple, no JVD  Respiratory: clear breath sounds bilaterally,  no use of extra accessory muscles.  Cardiovascular: RRR, normal S1, S2, no gallops, or murmurs.   Gastrointestinal: soft, non-tender, non-distended, normal bowel sounds,  Extremities: no edema, no clubbing, no cyanosis  Vascular: palpable peripheral pulses, no cyanosis,  Neurological: Awake, follows commands  Skin: no skin lesions, no rash, no ulceration                            8.9    14.37 )-----------( 282      ( 04 Apr 2025 05:25 )             27.5       04 Apr 2025 05:25    137    |  105    |  21     ----------------------------<  111[H]  4.0     |  16[L]  |  0.59   02 Apr 2025 09:23    136    |  103    |  11     ----------------------------<  93     4.3     |  21[L]  |  0.65     Ca    8.2[L]      04 Apr 2025 05:25  Ca    8.4        02 Apr 2025 09:23  Phos  3.2       04 Apr 2025 05:25  Mg     2.10      04 Apr 2025 05:25    TPro  5.6[L]  /  Alb  3.0[L]  /  TBili  0.3    /  DBili  x      /  AST  19     /  ALT  20     /  AlkPhos  143[H]  04 Apr 2025 05:25  TPro  5.6[L]  /  Alb  2.9[L]  /  TBili  0.4    /  DBili  x      /  AST  34[H]  /  ALT  26     /  AlkPhos  156[H]  02 Apr 2025 09:23      < from: TTE W or WO Ultrasound Enhancing Agent (04.03.25 @ 10:19) >     CONCLUSIONS:      1. Left ventricular cavity is normal in size. Left ventricular wall thickness is normal. Left ventricular systolic function is normal with an ejection fraction of 56 % by 3D. There are no regional wall motion abnormalities seen.   2. Normal left ventricular diastolic function.   3. Left ventricular globallongitudinal strain is -19.0 % which is normal (< -18%). Images were acquired on a [employer] ultrasound system and processed on the ultrasound machine with a heart rate of 82 bpm and a blood pressure of 138/93 mmHg.   4. Normal right ventricular cavity size and normal right ventricular systolic function. Tricuspid annular plane systolic excursion (TAPSE) is 2.0 cm (normal >=1.7 cm).   5. Structurally normal mitral valve with normal leaflet excursion. There is trace mitral regurgitation.   6. The aortic valve appears trileaflet with normal systolic excursion. There is mild aortic regurgitation.    < end of copied text >                         Subjective and objective:  Overnight events: Denies CP, or SOB, c/o abd incisional pain, relieved with tylenol.   Tele event: SR, VR 80's , no ectopy      MEDICATIONS  (STANDING):  acetaminophen   Oral Liquid .. 975 milliGRAM(s) Oral every 6 hours  ascorbic acid 500 milliGRAM(s) Oral daily  chlorhexidine 2% Cloths 1 Application(s) Topical <User Schedule>  ferrous    sulfate 325 milliGRAM(s) Oral three times a day  heparin   Injectable 5000 Unit(s) SubCutaneous every 12 hours  hydrALAZINE 25 milliGRAM(s) Oral three times a day  ibuprofen  Suspension. 600 milliGRAM(s) Oral every 6 hours  labetalol 600 milliGRAM(s) Oral three times a day  NIFEdipine XL 60 milliGRAM(s) Oral daily  NIFEdipine XL 30 milliGRAM(s) Oral daily    MEDICATIONS  (PRN):  diphenhydrAMINE 25 milliGRAM(s) Oral every 6 hours PRN Pruritus  lanolin Ointment 1 Application(s) Topical every 6 hours PRN Sore Nipples  magnesium hydroxide Suspension 30 milliLiter(s) Oral two times a day PRN Constipation  oxyCODONE    IR 5 milliGRAM(s) Oral once PRN Moderate to Severe Pain (4-10)  oxyCODONE    IR 5 milliGRAM(s) Oral once PRN Moderate to Severe Pain (4-10)  oxyCODONE    IR 5 milliGRAM(s) Oral every 3 hours PRN Moderate to Severe Pain (4-10)  senna 1 Tablet(s) Oral two times a day PRN Constipation  simethicone 80 milliGRAM(s) Chew every 4 hours PRN Gas        Vital Signs Last 24 Hrs  T(C): 37.1 (04 Apr 2025 06:00), Max: 37.1 (04 Apr 2025 06:00)  T(F): 98.8 (04 Apr 2025 06:00), Max: 98.8 (04 Apr 2025 06:00)  HR: 86 (04 Apr 2025 08:00) (70 - 103)  BP: 154/113 (04 Apr 2025 08:00) (124/99 - 173/106)  BP(mean): 125 (04 Apr 2025 08:00) (102 - 125)  RR: 14 (03 Apr 2025 21:00) (14 - 24)  SpO2: 100% (04 Apr 2025 08:00) (96% - 100%)    Parameters below as of 03 Apr 2025 14:20  Patient On (Oxygen Delivery Method): room air      I&O's Detail      Physical Exam:   Constitutional: well appearing  Neck: supple, no JVD  Respiratory: clear breath sounds bilaterally,  no use of extra accessory muscles.  Cardiovascular: RRR, normal S1, S2, no gallops, or murmurs.   Gastrointestinal: soft, non-tender, non-distended, normal bowel sounds,  Extremities: no edema, no clubbing, no cyanosis  Vascular: palpable peripheral pulses, no cyanosis,  Neurological: Awake, follows commands  Skin: no skin lesions, no rash, no ulceration, low abdominal incision with steri strips c/d/i, +small ecchymosis around the incision site.                             8.9    14.37 )-----------( 282      ( 04 Apr 2025 05:25 )             27.5       04 Apr 2025 05:25    137    |  105    |  21     ----------------------------<  111[H]  4.0     |  16[L]  |  0.59   02 Apr 2025 09:23    136    |  103    |  11     ----------------------------<  93     4.3     |  21[L]  |  0.65     Ca    8.2[L]      04 Apr 2025 05:25  Ca    8.4        02 Apr 2025 09:23  Phos  3.2       04 Apr 2025 05:25  Mg     2.10      04 Apr 2025 05:25    TPro  5.6[L]  /  Alb  3.0[L]  /  TBili  0.3    /  DBili  x      /  AST  19     /  ALT  20     /  AlkPhos  143[H]  04 Apr 2025 05:25  TPro  5.6[L]  /  Alb  2.9[L]  /  TBili  0.4    /  DBili  x      /  AST  34[H]  /  ALT  26     /  AlkPhos  156[H]  02 Apr 2025 09:23      < from: TTE W or WO Ultrasound Enhancing Agent (04.03.25 @ 10:19) >     CONCLUSIONS:      1. Left ventricular cavity is normal in size. Left ventricular wall thickness is normal. Left ventricular systolic function is normal with an ejection fraction of 56 % by 3D. There are no regional wall motion abnormalities seen.   2. Normal left ventricular diastolic function.   3. Left ventricular global longitudinal strain is -19.0 % which is normal (< -18%). Images were acquired on a [employer] ultrasound system and processed on the ultrasound machine with a heart rate of 82 bpm and a blood pressure of 138/93 mmHg.   4. Normal right ventricular cavity size and normal right ventricular systolic function. Tricuspid annular plane systolic excursion (TAPSE) is 2.0 cm (normal >=1.7 cm).   5. Structurally normal mitral valve with normal leaflet excursion. There is trace mitral regurgitation.   6. The aortic valve appears trileaflet with normal systolic excursion. There is mild aortic regurgitation.    < end of copied text >

## 2025-04-04 NOTE — DIETITIAN INITIAL EVALUATION ADULT - ADD RECOMMEND
- Consider low sodium addition to diet  - Monitor PO intake, tolerance to diet/supplement, nutrition related lab values, weight trends, BMs/GI distress, hydration status, skin integrity

## 2025-04-04 NOTE — CHART NOTE - NSCHARTNOTESELECT_GEN_ALL_CORE
Event Note
Labor review
R3 PTA
Resident evaluation
Resident evaluation
labor note
CCU accept/Transfer Note
CardioOB/Event Note
CardioOB/Event Note
Procardia/Event Note
down grade to OBGYN floor/Event Note
labor note

## 2025-04-04 NOTE — PROGRESS NOTE ADULT - CRITICAL CARE ATTENDING COMMENT
24 year old woman  who delivered on 3/30/25 at 34 weeks with course complicated PEC with severe PEC. Difficulty controlling her BP.    TTE    1. Left ventricular cavity is normal in size. Left ventricular wall thickness is normal. Left ventricular systolic function is normal with an ejection fraction of 56 % by 3D. There are no regional wall motion abnormalities seen.   2. Normal left ventricular diastolic function.   3. Left ventricular globallongitudinal strain is -19.0 % which is normal (< -18%). Images were acquired on a [employer] ultrasound system and processed on the ultrasound machine with a heart rate of 82 bpm and a blood pressure of 138/93 mmHg.   4. Normal right ventricular cavity size and normal right ventricular systolic function. Tricuspid annular plane systolic excursion (TAPSE) is 2.0 cm (normal >=1.7 cm).   5. Structurally normal mitral valve with normal leaflet excursion. There is trace mitral regurgitation.   6. The aortic valve appears trileaflet with normal systolic excursion. There is mild aortic regurgitation.    Meds:  Hydralazine 25 mg TID  Labetalol 600 mg TID  Nifedipine 30 mg AM/ 60 mg PM  SQ Heparin    -increase to Hydralazine 50 mg TID  -increase to Nifedipine 60 mg TID

## 2025-04-04 NOTE — DIETITIAN INITIAL EVALUATION ADULT - PERTINENT LABORATORY DATA
04-04    137  |  105  |  21  ----------------------------<  111[H]  4.0   |  16[L]  |  0.59    Ca    8.2[L]      04 Apr 2025 05:25  Phos  3.2     04-04  Mg     2.10     04-04    TPro  5.6[L]  /  Alb  3.0[L]  /  TBili  0.3  /  DBili  x   /  AST  19  /  ALT  20  /  AlkPhos  143[H]  04-04

## 2025-04-04 NOTE — PROGRESS NOTE ADULT - ASSESSMENT
{\rtf1\[id number]\ansi\ibnabrf5239\ftnbj\uc1\deff0  {\fonttbl{\f0 \fnil Segoe UI;}{\f1 \fnil \fcharset0 Segoe UI;}{\f2 \fnil Times New Tony;}}  {\colortbl ;\dmp894\fcjqm149\opot750 ;\red0\green0\blue0 ;\red0\green0\bozb872 ;\red0\green0\blue0 ;}  {\stylesheet{\f0\fs20 Normal;}{\cs1 Default Paragraph Font;}{\cs2\f0\fs16 Line Number;}{\cs3\f2\fs24\ul\cf3 Hyperlink;}}  {\*\revtbl{Unknown;}}  \brwhez54900\lyggkz58427\ehcug7006\levcs6284\ertwt7053\rgset5700\aciabyz684\chpmdtp241\nogrowautofit\zubipi767\formshade\nofeaturethrottle1\dntblnsbdb\fet4\aendnotes\aftnnrlc\pgbrdrhead\pgbrdrfoot  \sectd\ubpcbs52662\caopqr29875\guttersxn0\cxdnvobz7747\mypgwuur3328\dpweeogu9577\oeqofvmh4382\nbmcbic948\rfjnwtw418\sbkpage\pgncont\pgndec  \plain\plain\f0\fs24\ql\plain\f0\fs24\plain\f0\fs20\ejjn3743\hich\f0\dbch\f0\loch\f0\fs20 23 yo F with PMHx migraine presenting with severe range blood pressures, post op day #5 from  for PEC. Pressure has been difficult to control, necessitating   multiple IVP's of Labetalol as well as Nifedipine IR. She has been on Nifedipine 90 mg daily for three days, Labetalol increased from 200 tid to 400 TID. She denies shortness of breath, palpitations, chest pressure, headache, change in vision. Admits   that she is anxious. States that she feels that her BPs have never been "normal" even before pregnancy.\par  \par  \par  Neuro:\par  -A&Ox3\par  \par  Resp:\par  -Satting well on RA\par  \par  \par  Cardiac: \par  #Pre-eclampsia \par  - Left ventricular cavity is normal in size. Left ventricular wall thickness is normal. Left ventricular systolic function is normal with an ejection fraction of 56 % by 3D\par  - Increase labetalol 600 PO TID\par  - Continue Procardia 60mg PO am and Procardia 30mg PO daily \par  - Add hydrazine 25mg PO TID \par  - B/p goal SBP<130 \par  - Start Cardene gtt if B/P remains elevated at 6pm (d/c Procardia once gtt started)\par  \par  GI:\par  - Continue diet \par  \par  :\par  - Scr WNL \par  \par  Heme:\par  #anemia\par  - H&H trend as above\par  - s/p 2uPRBCs with adequate rise in H&H\par  \par  #Postpartum\par  - Continue with po analgesia\par  - Continue regular diet\par  - Incision site c/d/i\par  \par  DVT PPx:\par  - HSQ.\plain\f1\fs20\drlz1163\hich\f1\dbch\f1\loch\f1\cf2\fs20\strike\plain\f1\fs20\smbc3437\hich\f1\dbch\f1\loch\f1\cf2\fs20\plain\f0\fs20\gqsv7799\hich\f0\dbch\f0\loch\f0\fs20\par  }   23 yo F with PMHx migraine presenting with severe range blood pressures, post op day #5 from  for PEC. Pressure has been difficult to control, necessitating multiple IVP's of Labetalol as well as Nifedipine IR. She has been on Nifedipine 90 mg daily for three days, Labetalol increased from 200 tid to 400 TID. She denies shortness of breath, palpitations, chest pressure, headache, change in vision. Admits that she is anxious. States that she feels that her BPs have never been "normal" even before pregnancy.      Neuro:  -A&Ox3    Resp:  -Satting well on RA      Cardiac:   #Pre-eclampsia   - Left ventricular cavity is normal in size. Left ventricular wall thickness is normal. Left ventricular systolic function is normal with an ejection fraction of 56 % by 3D  - Increase labetalol 600 PO TID  - Continue Procardia 60mg PO am and Procardia 30mg PO daily   - Add hydrazine 25mg PO TID   - B/p goal SBP<130   - Start Cardene gtt if B/P remains elevated at 6pm (d/c Procardia once gtt started)  - consdier inc hydral to 50mg TID    GI:  - Continue diet     :  - Scr WNL     Heme:  #anemia  - H&H trend as above  - s/p 2uPRBCs with adequate rise in H&H    #Postpartum  - Continue with po analgesia  - Continue regular diet  - Incision site c/d/i    DVT PPx:  - HSQ. 25 yo F with PMHx migraine presenting with severe range blood pressures, post op day #5 from  for PEC. Pressure has been difficult to control, necessitating multiple IVP's of Labetalol as well as Nifedipine IR. She has been on Nifedipine 90 mg daily for three days, Labetalol increased from 200 tid to 400 TID. She denies shortness of breath, palpitations, chest pressure, headache, change in vision. Admits that she is anxious. States that she feels that her BPs have never been "normal" even before pregnancy.      Neuro:  -A&Ox3    Resp:  -Satting well on RA      Cardiac:   #Pre-eclampsia   - Left ventricular cavity is normal in size. Left ventricular wall thickness is normal. Left ventricular systolic function is normal with an ejection fraction of 56 % by 3D  - Increase labetalol 600 PO TID  - Continue Procardia 60mg PO am and Procardia 30mg PO daily   - Add hydrazine 25mg PO TID   - B/p goal SBP<130   - Start Cardene gtt if B/P remains elevated at 6pm (d/c Procardia once gtt started)  - will give additional hydral 25mg X1, then inc to 50mg TID  -one dose procardia 30mg X1, then inc to 60mg BID    GI:  - Continue diet     :  - Scr WNL     Heme:  #anemia  - H&H trend as above  - s/p 2uPRBCs with adequate rise in H&H    #Postpartum  - Continue with po analgesia  - Continue regular diet  - Incision site c/d/i    DVT PPx:  - HSQ. 23 yo F with PMHx migraine presenting with severe range blood pressures, post op day #6 from  for PEC. Pressure has been difficult to control, necessitating multiple IVP's of Labetalol as well as Nifedipine IR. She has been on Nifedipine 90 mg daily for three days, Labetalol increased from 200 tid to 400 TID. She denies shortness of breath, palpitations, chest pressure, headache, change in vision. Pt admits that she is anxious, and reports that she feels her BPs have never been "normal" even before pregnancy. Admit to CCU for closer monitoring and med management.      Neuro:  -A&Ox3    Resp:  -Satting well on RA      Cardiac:   #Pre-eclampsia (PEC)  - Left ventricular cavity is normal in size. Left ventricular wall thickness is normal. Left ventricular systolic function is normal with an ejection fraction of 56 % by 3D  - Increase labetalol 600 PO TID  - Procardia 60mg PO pm and Procardia 30mg PO am (total 90mg)  - Add hydrazine 25mg PO TID   - B/p goal SBP<130   - Start Cardene gtt if B/P remains elevated at 6pm (d/c Procardia once gtt started)  - will give additional hydral 25mg X1, then inc to 50mg TID  - procardia 30mg X1, then inc to 60mg BID    GI:  - Continue diet     :  - Scr WNL     Heme:  #anemia  - H&H trend as above  - s/p 2uPRBCs with adequate rise in H&H    OBGYN:  #Postpartum  - Continue with po analgesia   - Continue regular diet  - Incision site c/d/i  - c/w breast pump     DVT PPx:  - HSQ.    Dispo  -eligible to floor.  25 yo F with PMHx migraine presenting with severe range blood pressures, post op day #6 from  for PEC. Pressure has been difficult to control, necessitating multiple IVP's of Labetalol as well as Nifedipine IR. She has been on Nifedipine 90 mg daily for three days, Labetalol increased from 200 tid to 400 TID. She denies shortness of breath, palpitations, chest pressure, headache, change in vision. Pt admits that she is anxious, and reports that she feels her BPs have never been "normal" even before pregnancy. Admit to CCU for closer monitoring and med management.      Neuro:  -A&Ox3    Resp:  -Satting well on RA      Cardiac:   #Pre-eclampsia (PEC)  - Left ventricular cavity is normal in size. Left ventricular wall thickness is normal. Left ventricular systolic function is normal with an ejection fraction of 56 % by 3D  - Increase labetalol 600 PO TID  - Procardia 60mg PO pm and Procardia 30mg PO am (total 90mg)  - Add hydrazine 25mg PO TID   - B/p goal SBP<130   - Start Cardene gtt if B/P remains elevated at 6pm (d/c Procardia once gtt started)  - will give additional hydral 25mg X1, then inc to 50mg TID  - procardia 30mg X1, then inc to 60mg BID    GI:  - Continue diet     :  - Scr WNL     Heme:  #anemia  - H&H trend as above  - s/p 2uPRBCs with adequate rise in H&H    OBGYN:  #Postpartum  - Continue with po analgesia   - Continue regular diet  - Incision site c/d/i  - c/w breast pump     DVT PPx:  - HSQ.    Dispo  -eligible to floor. plan to discharge with home care.  will follow.

## 2025-04-04 NOTE — DIETITIAN INITIAL EVALUATION ADULT - PERTINENT MEDS FT
MEDICATIONS  (STANDING):  acetaminophen   Oral Liquid .. 975 milliGRAM(s) Oral every 6 hours  ascorbic acid 500 milliGRAM(s) Oral daily  chlorhexidine 2% Cloths 1 Application(s) Topical <User Schedule>  ferrous    sulfate 325 milliGRAM(s) Oral three times a day  heparin   Injectable 5000 Unit(s) SubCutaneous every 12 hours  hydrALAZINE 50 milliGRAM(s) Oral every 8 hours  ibuprofen  Suspension. 600 milliGRAM(s) Oral every 6 hours  labetalol 600 milliGRAM(s) Oral three times a day  NIFEdipine XL 60 milliGRAM(s) Oral two times a day    MEDICATIONS  (PRN):  diphenhydrAMINE 25 milliGRAM(s) Oral every 6 hours PRN Pruritus  lanolin Ointment 1 Application(s) Topical every 6 hours PRN Sore Nipples  magnesium hydroxide Suspension 30 milliLiter(s) Oral two times a day PRN Constipation  oxyCODONE    IR 5 milliGRAM(s) Oral once PRN Moderate to Severe Pain (4-10)  oxyCODONE    IR 5 milliGRAM(s) Oral once PRN Moderate to Severe Pain (4-10)  oxyCODONE    IR 5 milliGRAM(s) Oral every 3 hours PRN Moderate to Severe Pain (4-10)  senna 1 Tablet(s) Oral two times a day PRN Constipation  simethicone 80 milliGRAM(s) Chew every 4 hours PRN Gas

## 2025-04-04 NOTE — PROGRESS NOTE ADULT - SUBJECTIVE AND OBJECTIVE BOX
OB Progress Note:  Delivery, POD#6    S: Patient feels well. Pain is well controlled. She is tolerating a regular diet and passing flatus. She is voiding spontaneously and ambulating without difficulty. Endorses light vaginal bleeding, soaking < 1 pad/hour. Denies CP/SOB, lightheadedness/dizziness, N/V.    MEDICATIONS  (STANDING):  acetaminophen   Oral Liquid .. 975 milliGRAM(s) Oral every 6 hours  ascorbic acid 500 milliGRAM(s) Oral daily  chlorhexidine 2% Cloths 1 Application(s) Topical <User Schedule>  ferrous    sulfate 325 milliGRAM(s) Oral three times a day  heparin   Injectable 5000 Unit(s) SubCutaneous every 12 hours  hydrALAZINE 25 milliGRAM(s) Oral three times a day  ibuprofen  Suspension. 600 milliGRAM(s) Oral every 6 hours  labetalol 600 milliGRAM(s) Oral three times a day  NIFEdipine XL 60 milliGRAM(s) Oral daily  NIFEdipine XL 30 milliGRAM(s) Oral daily      MEDICATIONS  (PRN):  diphenhydrAMINE 25 milliGRAM(s) Oral every 6 hours PRN Pruritus  lanolin Ointment 1 Application(s) Topical every 6 hours PRN Sore Nipples  magnesium hydroxide Suspension 30 milliLiter(s) Oral two times a day PRN Constipation  oxyCODONE    IR 5 milliGRAM(s) Oral once PRN Moderate to Severe Pain (4-10)  oxyCODONE    IR 5 milliGRAM(s) Oral once PRN Moderate to Severe Pain (4-10)  oxyCODONE    IR 5 milliGRAM(s) Oral every 3 hours PRN Moderate to Severe Pain (4-10)  senna 1 Tablet(s) Oral two times a day PRN Constipation  simethicone 80 milliGRAM(s) Chew every 4 hours PRN Gas      O:  Vitals:  Vital Signs Last 24 Hrs  T(C): 37.1 (2025 06:00), Max: 37.1 (2025 06:00)  T(F): 98.8 (2025 06:00), Max: 98.8 (2025 06:00)  HR: 90 (2025 06:00) (70 - 103)  BP: 155/109 (2025 06:00) (124/99 - 173/106)  BP(mean): 121 (2025 06:00) (102 - 121)  RR: 14 (2025 21:00) (14 - 24)  SpO2: 97% (2025 06:00) (96% - 100%)    Parameters below as of 2025 14:20  Patient On (Oxygen Delivery Method): room air        Physical Exam:  General: NAD  Heart: Clinically well-perfused  Lungs: Breathing comfortably on room air  Abdomen: Soft, non-distended, appropriately tender, fundus firm  Incision: Pfannenstiel incision with steri strips in place, clean/dry/intact  Extremities: No calf edema/tenderness    Labs:  Blood type: A Positive  Rubella IgG: RPR: Negative                          8.9[L]   14.37[H] >-----------< 282    (  @ 05:25 )             27.5[L]                        8.8[L]   14.71[H] >-----------< 242    (  @ 09:23 )             26.6[L]                        6.3[LL]   14.26[H] >-----------< 195    (  @ 17:41 )             19.3[LL]    25 @ 05:25      137  |  105  |  21  ----------------------------<  111[H]  4.0   |  16[L]  |  0.59    25 @ 09:23      136  |  103  |  11  ----------------------------<  93  4.3   |  21[L]  |  0.65    25 @ 17:41      136  |  104  |  12  ----------------------------<  82  5.0   |  21[L]  |  0.64        Ca    8.2[L]      2025 05:25  Ca    8.4      2025 09:23  Ca    8.1[L]      2025 17:41  Phos  3.2     -  Mg     2.10     04-04    TPro  5.6[L]  /  Alb  3.0[L]  /  TBili  0.3  /  DBili  x   /  AST  19  /  ALT  20  /  AlkPhos  143[H]  25 @ 05:25  TPro  5.6[L]  /  Alb  2.9[L]  /  TBili  0.4  /  DBili  x   /  AST  34[H]  /  ALT  26  /  AlkPhos  156[H]  25 @ 09:23  TPro  5.2[L]  /  Alb  2.7[L]  /  TBili  <0.2  /  DBili  x   /  AST  40[H]  /  ALT  20  /  AlkPhos  145[H]  25 @ 17:41

## 2025-04-04 NOTE — PROGRESS NOTE ADULT - ASSESSMENT
25y/o  POD#6 from pLTCS at 34w5d for failed IOL,  delivery d/t PEC w/ SF. Difficulty controlling BPs postpartum requiring transfer to CCU. Patient is stable.    #anemia  - H&H trend as above  - s/p 2uPRBCs with adequate rise in H&H    #sPEC  - sp Mg  - Uptitrated to Pro 60/30, Lab 600 TID, and Hydral 25 TID  - TTE wnl  - cont to monitor pressures/ symptoms     #Postpartum  - Continue with po analgesia  - Increase ambulation  - Continue regular diet  - Incision site c/d/i    HSinks PGY3

## 2025-04-04 NOTE — DIETITIAN INITIAL EVALUATION ADULT - OTHER INFO
24 year old female with a PMH of migraine presenting with severe range blood pressures, post op day #6 from  for PEC per chart.    Patient reports good appetite. No intake noted per chart. No GI distress reported. 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 UBW prior to pregnancy was 112-115 lbs. ABW is 138.8 lbs. (3/28) per chart. No edema or pressure injuries noted per RN flow sheet.    Patient w/ no questions regarding nutrition at this time.

## 2025-04-04 NOTE — CHART NOTE - NSCHARTNOTEFT_GEN_A_CORE
25 yo F with PMHx migraine presenting with severe range blood pressures, post op day #6 from  for PEC. Pressure has been difficult to control, necessitating multiple IVP's of Labetalol as well as Nifedipine IR. She has been on Nifedipine 90 mg daily for three days, Labetalol increased from 200 tid to 400 TID. She denies shortness of breath, palpitations, chest pressure, headache, change in vision. Pt admits that she is anxious, and reports that she feels her BPs have never been "normal" even before pregnancy. Admit to CCU for closer monitoring and med management. Pt hemodynamicall stable and ready for transfer.     plan  -c/w hydral 50mg TID, labetalol 600mg TID, nifedipine XL 60mg BID  -current /98, pt asymptomatic  -pt to be transferred to [facility]  -sign out given to Dr. Mari Beckham [id number]

## 2025-04-05 PROCEDURE — 99232 SBSQ HOSP IP/OBS MODERATE 35: CPT | Mod: GC

## 2025-04-05 RX ADMIN — Medication 975 MILLIGRAM(S): at 23:59

## 2025-04-05 RX ADMIN — HEPARIN SODIUM 5000 UNIT(S): 1000 INJECTION INTRAVENOUS; SUBCUTANEOUS at 18:01

## 2025-04-05 RX ADMIN — Medication 50 MILLIGRAM(S): at 06:14

## 2025-04-05 RX ADMIN — Medication 600 MILLIGRAM(S): at 07:00

## 2025-04-05 RX ADMIN — Medication 325 MILLIGRAM(S): at 06:14

## 2025-04-05 RX ADMIN — LABETALOL HYDROCHLORIDE 600 MILLIGRAM(S): 200 TABLET, FILM COATED ORAL at 14:13

## 2025-04-05 RX ADMIN — Medication 50 MILLIGRAM(S): at 14:13

## 2025-04-05 RX ADMIN — Medication 325 MILLIGRAM(S): at 09:04

## 2025-04-05 RX ADMIN — Medication 50 MILLIGRAM(S): at 22:01

## 2025-04-05 RX ADMIN — Medication 600 MILLIGRAM(S): at 14:16

## 2025-04-05 RX ADMIN — LABETALOL HYDROCHLORIDE 600 MILLIGRAM(S): 200 TABLET, FILM COATED ORAL at 22:01

## 2025-04-05 RX ADMIN — Medication 600 MILLIGRAM(S): at 22:02

## 2025-04-05 RX ADMIN — Medication 600 MILLIGRAM(S): at 23:02

## 2025-04-05 RX ADMIN — Medication 60 MILLIGRAM(S): at 09:00

## 2025-04-05 RX ADMIN — Medication 60 MILLIGRAM(S): at 22:02

## 2025-04-05 RX ADMIN — Medication 975 MILLIGRAM(S): at 20:07

## 2025-04-05 RX ADMIN — Medication 600 MILLIGRAM(S): at 06:15

## 2025-04-05 RX ADMIN — Medication 975 MILLIGRAM(S): at 17:58

## 2025-04-05 RX ADMIN — Medication 600 MILLIGRAM(S): at 15:23

## 2025-04-05 RX ADMIN — LABETALOL HYDROCHLORIDE 600 MILLIGRAM(S): 200 TABLET, FILM COATED ORAL at 06:14

## 2025-04-05 RX ADMIN — HEPARIN SODIUM 5000 UNIT(S): 1000 INJECTION INTRAVENOUS; SUBCUTANEOUS at 09:01

## 2025-04-05 RX ADMIN — Medication 325 MILLIGRAM(S): at 22:01

## 2025-04-05 RX ADMIN — Medication 500 MILLIGRAM(S): at 09:01

## 2025-04-05 NOTE — PROGRESS NOTE ADULT - SUBJECTIVE AND OBJECTIVE BOX
Subjective and objective:  Overnight events: Denies any symptoms. BP ranged 117/66 - 159/100 overnight.        MEDICATIONS  (STANDING):  acetaminophen   Oral Liquid .. 975 milliGRAM(s) Oral every 6 hours  ascorbic acid 500 milliGRAM(s) Oral daily  ferrous    sulfate 325 milliGRAM(s) Oral three times a day  heparin   Injectable 5000 Unit(s) SubCutaneous every 12 hours  hydrALAZINE 50 milliGRAM(s) Oral every 8 hours  ibuprofen  Suspension. 600 milliGRAM(s) Oral every 6 hours  labetalol 600 milliGRAM(s) Oral three times a day  NIFEdipine XL 60 milliGRAM(s) Oral two times a day    MEDICATIONS  (PRN):  diphenhydrAMINE 25 milliGRAM(s) Oral every 6 hours PRN Pruritus  lanolin Ointment 1 Application(s) Topical every 6 hours PRN Sore Nipples  magnesium hydroxide Suspension 30 milliLiter(s) Oral two times a day PRN Constipation  oxyCODONE    IR 5 milliGRAM(s) Oral once PRN Moderate to Severe Pain (4-10)  oxyCODONE    IR 5 milliGRAM(s) Oral every 3 hours PRN Moderate to Severe Pain (4-10)  oxyCODONE    IR 5 milliGRAM(s) Oral once PRN Moderate to Severe Pain (4-10)  senna 1 Tablet(s) Oral two times a day PRN Constipation  simethicone 80 milliGRAM(s) Chew every 4 hours PRN Gas        Vital Signs Last 24 Hrs  T(C): 37.1 (05 Apr 2025 09:14), Max: 37.1 (05 Apr 2025 01:00)  T(F): 98.7 (05 Apr 2025 09:14), Max: 98.8 (05 Apr 2025 01:00)  HR: 86 (05 Apr 2025 09:14) (85 - 102)  BP: 117/66 (05 Apr 2025 09:14) (117/66 - 159/100)  BP(mean): 108 (04 Apr 2025 16:00) (108 - 119)  RR: 18 (05 Apr 2025 09:14) (17 - 18)  SpO2: 99% (05 Apr 2025 09:14) (98% - 100%)    Parameters below as of 05 Apr 2025 06:12  Patient On (Oxygen Delivery Method): room air      I&O's Detail      Physical Exam:   Constitutional: well appearing  Neck: supple, no JVD  Respiratory: clear breath sounds bilaterally,  no use of extra accessory muscles.  Cardiovascular: RRR, normal S1, S2, no gallops, or murmurs.   Gastrointestinal: soft, non-tender, non-distended, normal bowel sounds,  Extremities: no edema, no clubbing, no cyanosis  Vascular: palpable peripheral pulses, no cyanosis,  Neurological: Awake, follows commands  Skin: no skin lesions, no rash, no ulceration, abdominal incision intact, +ecchymosis                            8.9    14.37 )-----------( 282      ( 04 Apr 2025 05:25 )             27.5       04 Apr 2025 05:25    137    |  105    |  21     ----------------------------<  111[H]  4.0     |  16[L]  |  0.59     Ca    8.2[L]      04 Apr 2025 05:25  Phos  3.2       04 Apr 2025 05:25  Mg     2.10      04 Apr 2025 05:25    TPro  5.6[L]  /  Alb  3.0[L]  /  TBili  0.3    /  DBili  x      /  AST  19     /  ALT  20     /  AlkPhos  143[H]  04 Apr 2025 05:25        < from: TTE W or WO Ultrasound Enhancing Agent (04.03.25 @ 10:19) >     CONCLUSIONS:      1. Left ventricular cavity is normal in size. Left ventricular wall thickness is normal. Left ventricular systolic function is normal with an ejection fraction of 56 % by 3D. There are no regional wall motion abnormalities seen.   2. Normal left ventricular diastolic function.   3. Left ventricular global longitudinal strain is -19.0 % which is normal (< -18%). Images were acquired on a [employer] ultrasound system and processed on the ultrasound machine with a heart rate of 82 bpm and a blood pressure of 138/93 mmHg.   4. Normal right ventricular cavity size and normal right ventricular systolic function. Tricuspid annular plane systolic excursion (TAPSE) is 2.0 cm (normal >=1.7 cm).   5. Structurally normal mitral valve with normal leaflet excursion. There is trace mitral regurgitation.   6. The aortic valve appears trileaflet with normal systolic excursion. There is mild aortic regurgitation.    < end of copied text >

## 2025-04-05 NOTE — PROGRESS NOTE ADULT - ASSESSMENT
23y/o  POD#7 from pLTCS at 34w5d for failed IOL,  delivery d/t PEC w/ SF. Difficulty controlling BPs postpartum requiring transfer to CCU, now downgraded. Patient is stable.    #Anemia  - H&H trend as above  - s/p 2uPRBCs with adequate rise in H&H    #sPEC  - sp Mg  - Uptitrated to Pro 60/60, Lab 600 TID, and Hydral 25 TID  - TTE wnl  - cont to monitor pressures/ symptoms     #Postpartum  - Continue with po analgesia  - Increase ambulation  - Continue regular diet  - Incision site c/d/i    HSinks PGY3

## 2025-04-05 NOTE — PROGRESS NOTE ADULT - ASSESSMENT
25 yo F with PMHx migraine presenting with severe range blood pressures, s/p  for PEC on 3/29, admitted to CCU for persistent htn for closer monitoring, now transferred back to floor with stable BP.       Cardiac:   #Pre-eclampsia (PEC)  - Left ventricular cavity is normal in size. Left ventricular wall thickness is normal. Left ventricular systolic function is normal with an ejection fraction of 56 % by 3D  - c/w labetalol 600 PO TID  - c/w Procardia XL 60mg PO BID  - c/w hydrazine 50mg PO TID   - B/p goal SBP<130   -    25 yo F with PMHx migraine presenting with severe range blood pressures, s/p  for PEC on 3/29, admitted to CCU for persistent htn for closer monitoring, now transferred back to floor with stable BP.       Cardiac:   #Pre-eclampsia (PEC)  - Left ventricular cavity is normal in size. Left ventricular wall thickness is normal. Left ventricular systolic function is normal with an ejection fraction of 56 % by 3D  - c/w labetalol 600 PO TID  - c/w Procardia XL 60mg PO BID  - c/w hydrazine 50mg PO TID   - continue to monitor BP  - if any questions, call [phone]

## 2025-04-05 NOTE — PROGRESS NOTE ADULT - SUBJECTIVE AND OBJECTIVE BOX
OB Progress Note:  Delivery, POD#7    S: Patient feels well. Pain is well controlled. She is tolerating a regular diet and passing flatus. She is voiding spontaneously and ambulating without difficulty. Endorses light vaginal bleeding, soaking < 1 pad/hour. Denies CP/SOB, lightheadedness/dizziness, N/V. Denies headache, visual changes, RUQ pain, worsening edema.    MEDICATIONS  (STANDING):  acetaminophen   Oral Liquid .. 975 milliGRAM(s) Oral every 6 hours  ascorbic acid 500 milliGRAM(s) Oral daily  ferrous    sulfate 325 milliGRAM(s) Oral three times a day  heparin   Injectable 5000 Unit(s) SubCutaneous every 12 hours  hydrALAZINE 50 milliGRAM(s) Oral every 8 hours  ibuprofen  Suspension. 600 milliGRAM(s) Oral every 6 hours  labetalol 600 milliGRAM(s) Oral three times a day  NIFEdipine XL 60 milliGRAM(s) Oral two times a day      MEDICATIONS  (PRN):  diphenhydrAMINE 25 milliGRAM(s) Oral every 6 hours PRN Pruritus  lanolin Ointment 1 Application(s) Topical every 6 hours PRN Sore Nipples  magnesium hydroxide Suspension 30 milliLiter(s) Oral two times a day PRN Constipation  oxyCODONE    IR 5 milliGRAM(s) Oral once PRN Moderate to Severe Pain (4-10)  oxyCODONE    IR 5 milliGRAM(s) Oral once PRN Moderate to Severe Pain (4-10)  oxyCODONE    IR 5 milliGRAM(s) Oral every 3 hours PRN Moderate to Severe Pain (4-10)  senna 1 Tablet(s) Oral two times a day PRN Constipation  simethicone 80 milliGRAM(s) Chew every 4 hours PRN Gas      O:  Vitals:  Vital Signs Last 24 Hrs  T(C): 37.1 (2025 06:12), Max: 37.1 (2025 01:00)  T(F): 98.7 (2025 06:12), Max: 98.8 (2025 01:00)  HR: 90 (2025 06:12) (85 - 102)  BP: 129/89 (2025 06:12) (129/89 - 159/100)  BP(mean): 108 (2025 16:00) (108 - 119)  RR: 17 (2025 06:12) (17 - 18)  SpO2: 98% (2025 06:12) (98% - 100%)    Parameters below as of 2025 06:12  Patient On (Oxygen Delivery Method): room air        Physical Exam:  General: NAD  Heart: Clinically well-perfused  Lungs: Breathing comfortably on room air  Abdomen: Soft, non-distended, appropriately tender, fundus firm  Incision: Pfannenstiel incision, clean/dry/intact  Extremities: No calf edema/tenderness    Labs:  Blood type: A Positive  Rubella IgG: RPR: Negative                          8.9[L]   14.37[H] >-----------< 282    (  @ 05:25 )             27.5[L]                        8.8[L]   14.71[H] >-----------< 242    (  @ 09:23 )             26.6[L]    25 @ 05:25      137  |  105  |  21  ----------------------------<  111[H]  4.0   |  16[L]  |  0.59    25 @ 09:23      136  |  103  |  11  ----------------------------<  93  4.3   |  21[L]  |  0.65        Ca    8.2[L]      2025 05:25  Ca    8.4      2025 09:23  Phos  3.2     -  Mg     2.10     -    TPro  5.6[L]  /  Alb  3.0[L]  /  TBili  0.3  /  DBili  x   /  AST  19  /  ALT  20  /  AlkPhos  143[H]  25 @ 05:25  TPro  5.6[L]  /  Alb  2.9[L]  /  TBili  0.4  /  DBili  x   /  AST  34[H]  /  ALT  26  /  AlkPhos  156[H]  25 @ 09:23

## 2025-04-06 VITALS
TEMPERATURE: 98 F | RESPIRATION RATE: 18 BRPM | OXYGEN SATURATION: 97 % | HEART RATE: 75 BPM | SYSTOLIC BLOOD PRESSURE: 127 MMHG | DIASTOLIC BLOOD PRESSURE: 86 MMHG

## 2025-04-06 PROCEDURE — 99232 SBSQ HOSP IP/OBS MODERATE 35: CPT | Mod: GC

## 2025-04-06 RX ORDER — LABETALOL HYDROCHLORIDE 200 MG/1
2 TABLET, FILM COATED ORAL
Qty: 360 | Refills: 0
Start: 2025-04-06 | End: 2025-06-04

## 2025-04-06 RX ORDER — NIFEDIPINE 30 MG
1 TABLET, EXTENDED RELEASE 24 HR ORAL
Qty: 120 | Refills: 0
Start: 2025-04-06 | End: 2025-06-04

## 2025-04-06 RX ORDER — ATOGEPANT 10 MG/1
1 TABLET ORAL
Refills: 0 | DISCHARGE

## 2025-04-06 RX ADMIN — Medication 50 MILLIGRAM(S): at 05:14

## 2025-04-06 RX ADMIN — HEPARIN SODIUM 5000 UNIT(S): 1000 INJECTION INTRAVENOUS; SUBCUTANEOUS at 05:14

## 2025-04-06 RX ADMIN — Medication 600 MILLIGRAM(S): at 13:18

## 2025-04-06 RX ADMIN — Medication 60 MILLIGRAM(S): at 09:38

## 2025-04-06 RX ADMIN — Medication 975 MILLIGRAM(S): at 00:57

## 2025-04-06 RX ADMIN — Medication 600 MILLIGRAM(S): at 09:42

## 2025-04-06 RX ADMIN — Medication 325 MILLIGRAM(S): at 13:56

## 2025-04-06 RX ADMIN — Medication 50 MILLIGRAM(S): at 13:56

## 2025-04-06 RX ADMIN — Medication 975 MILLIGRAM(S): at 05:13

## 2025-04-06 RX ADMIN — Medication 500 MILLIGRAM(S): at 09:42

## 2025-04-06 RX ADMIN — LABETALOL HYDROCHLORIDE 600 MILLIGRAM(S): 200 TABLET, FILM COATED ORAL at 05:13

## 2025-04-06 RX ADMIN — LABETALOL HYDROCHLORIDE 600 MILLIGRAM(S): 200 TABLET, FILM COATED ORAL at 13:56

## 2025-04-06 RX ADMIN — Medication 325 MILLIGRAM(S): at 05:14

## 2025-04-06 RX ADMIN — Medication 975 MILLIGRAM(S): at 06:13

## 2025-04-06 NOTE — PROGRESS NOTE ADULT - TIME BILLING
24-year-old female with a past medical history of migraines presented with severe range blood pressures in setting of pre-eclampsia requiring  section 3/29/25. She was admitted to the CCU for closer blood pressure monitoring and is now back to the floor for further BP management. Her blood pressures overnight ranged from 117/66 to 159/100 mmHg. She is currently asymptomatic. TTE 4/3/25 showed normal left ventricular size, wall thickness, and systolic function with an ejection fraction of 56% by 3D. Left ventricular diastolic function, global longitudinal strain, and right ventricular size and function were also normal. Trace mitral regurgitation and mild aortic regurgitation were noted.
24-year-old female with a past medical history of migraines presented with severe range blood pressures in setting of pre-eclampsia requiring  section 3/29/25. She was admitted to the CCU for closer blood pressure monitoring and is now back to the floor for further BP management. Her blood pressures overnight ranged from 117/66 to 159/100 mmHg. She is currently asymptomatic. TTE 4/3/25 showed normal left ventricular size, wall thickness, and systolic function with an ejection fraction of 56% by 3D. Left ventricular diastolic function, global longitudinal strain, and right ventricular size and function were also normal. Trace mitral regurgitation and mild aortic regurgitation were noted.

## 2025-04-06 NOTE — PROGRESS NOTE ADULT - NS ATTEND AMEND GEN_ALL_CORE FT
May Gomez is a 24-year-old female with a past medical history of migraines presented with severe range blood pressures in setting of pre-eclampsia requiring  section 3/29/25. She was admitted to the CCU for closer blood pressure monitoring and is now back to the floor for further BP management. Her blood pressures overnight ranged from 117/66 to 159/100 mmHg. She is currently asymptomatic. TTE 4/3/25 showed normal left ventricular size, wall thickness, and systolic function with an ejection fraction of 56% by 3D. Left ventricular diastolic function, global longitudinal strain, and right ventricular size and function were also normal. Trace mitral regurgitation and mild aortic regurgitation were noted. Her current medications include acetaminophen, ascorbic acid, ferrous sulfate, heparin, hydralazine, ibuprofen, labetalol, nifedipine XL, diphenhydramine (PRN), lanolin ointment (PRN), magnesium hydroxide (PRN), oxycodone (PRN), senna (PRN), and simethicone (PRN). She is on room air and saturating well. Her physical exam is notable for an intact abdominal incision with ecchymosis. The plan is to continue monitoring her blood pressure on the floor and continue her current antihypertensive regimen of hydralazine 50 mg oral every 8 hours, labetalol 600 mg oral 3X daily and nifedipine XL 60 mg oral twice daily.
May Gomez is a 24-year-old female with a past medical history of migraines presented with severe range blood pressures in setting of pre-eclampsia requiring  section 3/29/25. She was admitted to the CCU for closer blood pressure monitoring and is now back to the floor for further BP management. Her blood pressures overnight ranged from 117/66 to 159/100 mmHg. She is currently asymptomatic. TTE 4/3/25 showed normal left ventricular size, wall thickness, and systolic function with an ejection fraction of 56% by 3D. Left ventricular diastolic function, global longitudinal strain, and right ventricular size and function were also normal. Trace mitral regurgitation and mild aortic regurgitation were noted. Her current medications include acetaminophen, ascorbic acid, ferrous sulfate, heparin, hydralazine, ibuprofen, labetalol, nifedipine XL, diphenhydramine (PRN), lanolin ointment (PRN), magnesium hydroxide (PRN), oxycodone (PRN), senna (PRN), and simethicone (PRN). She is on room air and saturating well. Her physical exam is notable for an intact abdominal incision with ecchymosis. The plan is to continue monitoring her blood pressure on the floor and continue her current antihypertensive regimen of hydralazine 50 mg oral every 8 hours, labetalol 600 mg oral 3X daily and nifedipine XL 60 mg oral twice daily

## 2025-04-06 NOTE — PROGRESS NOTE ADULT - ASSESSMENT
25 yo F with PMHx migraine presenting with severe range blood pressures, s/p  for PEC on 3/29, admitted to CCU for persistent htn for closer monitoring, now transferred back to floor with stable BP.       Cardiac:   #Pre-eclampsia (PEC)  - ECHO reviewed, Normal LV function  -BP is more stable  -Continue current regimen of labetalol 600 PO TID, Procardia XL 60mg PO BID, hydrazine 50mg PO TID   - continue to monitor BP     23 yo F with PMHx migraine presenting with severe range blood pressures, s/p  for PEC on 3/29, admitted to CCU for persistent htn for closer monitoring, now transferred back to floor with stable BP.       Cardiac:   #Pre-eclampsia (PEC)  - ECHO reviewed, Normal LV function  -BP is more stable  -Continue current regimen of labetalol 600 PO TID, Procardia XL 60mg PO BID, hydrazine 50mg PO TID   - continue to monitor BP  -dc planning as per primary team  -should follow up with [facility] -Dr. Nella Leonard in 1 week [phone]

## 2025-04-06 NOTE — PROGRESS NOTE ADULT - SUBJECTIVE AND OBJECTIVE BOX
R3 Progress Note    Patient feels well. Pain is well controlled. She is tolerating a regular diet and passing flatus. She is voiding spontaneously and ambulating without difficulty. Endorses light vaginal bleeding, soaking < 1 pad/hour. Denies CP/SOB, lightheadedness/dizziness, N/V. Denies headache, visual changes, RUQ pain, worsening edema.    Vital Signs Last 24 Hours  T(C): 36.7 (04-06-25 @ 01:48), Max: 37.2 (04-05-25 @ 14:22)  HR: 81 (04-06-25 @ 01:48) (81 - 90)  BP: 119/87 (04-06-25 @ 01:48) (117/66 - 134/87)  RR: 18 (04-06-25 @ 01:48) (16 - 19)  SpO2: 100% (04-06-25 @ 01:48) (98% - 100%)    Physical Exam:  General: NAD  Heart: Clinically well-perfused  Lungs: Breathing comfortably on room air  Abdomen: Soft, non-distended, appropriately tender, fundus firm  Incision: Pfannenstiel incision, clean/dry/intact  Extremities: No calf edema/tenderness    Labs:    Blood Type: A Positive  Antibody Screen: Negative  RPR: Negative               8.9    14.37 )-----------( 282      ( 04-04 @ 05:25 )             27.5                8.8    14.71 )-----------( 242      ( 04-02 @ 09:23 )             26.6                6.3    14.26 )-----------( 195      ( 04-01 @ 17:41 )             19.3         MEDICATIONS  (STANDING):  acetaminophen   Oral Liquid .. 975 milliGRAM(s) Oral every 6 hours  ascorbic acid 500 milliGRAM(s) Oral daily  ferrous    sulfate 325 milliGRAM(s) Oral three times a day  heparin   Injectable 5000 Unit(s) SubCutaneous every 12 hours  hydrALAZINE 50 milliGRAM(s) Oral every 8 hours  ibuprofen  Suspension. 600 milliGRAM(s) Oral every 6 hours  labetalol 600 milliGRAM(s) Oral three times a day  NIFEdipine XL 60 milliGRAM(s) Oral two times a day    MEDICATIONS  (PRN):  diphenhydrAMINE 25 milliGRAM(s) Oral every 6 hours PRN Pruritus  lanolin Ointment 1 Application(s) Topical every 6 hours PRN Sore Nipples  magnesium hydroxide Suspension 30 milliLiter(s) Oral two times a day PRN Constipation  oxyCODONE    IR 5 milliGRAM(s) Oral once PRN Moderate to Severe Pain (4-10)  oxyCODONE    IR 5 milliGRAM(s) Oral every 3 hours PRN Moderate to Severe Pain (4-10)  oxyCODONE    IR 5 milliGRAM(s) Oral once PRN Moderate to Severe Pain (4-10)  senna 1 Tablet(s) Oral two times a day PRN Constipation  simethicone 80 milliGRAM(s) Chew every 4 hours PRN Gas

## 2025-04-06 NOTE — PROGRESS NOTE ADULT - SUBJECTIVE AND OBJECTIVE BOX
FOLLOW UP:  s/p   SUBJECTIVE/OBSERVATIONS:    INTERVAL EVENTS:    TELE EVENTS:     Vital Signs Last 24 Hrs  T(C): 36.7 (06 Apr 2025 05:18), Max: 37.2 (05 Apr 2025 14:22)  T(F): 98.1 (06 Apr 2025 05:18), Max: 98.9 (05 Apr 2025 14:22)  HR: 83 (06 Apr 2025 05:18) (81 - 90)  BP: 114/73 (06 Apr 2025 05:18) (114/73 - 134/87)  BP(mean): --  RR: 16 (06 Apr 2025 05:18) (16 - 19)  SpO2: 99% (06 Apr 2025 05:18) (99% - 100%)    Parameters below as of 06 Apr 2025 05:18  Patient On (Oxygen Delivery Method): room air      I&O's Summary      MEDICATIONS:  heparin   Injectable 5000 Unit(s) SubCutaneous every 12 hours  hydrALAZINE 50 milliGRAM(s) Oral every 8 hours  labetalol 600 milliGRAM(s) Oral three times a day  NIFEdipine XL 60 milliGRAM(s) Oral two times a day        acetaminophen   Oral Liquid .. 975 milliGRAM(s) Oral every 6 hours  diphenhydrAMINE 25 milliGRAM(s) Oral every 6 hours PRN  ibuprofen  Suspension. 600 milliGRAM(s) Oral every 6 hours  oxyCODONE    IR 5 milliGRAM(s) Oral once PRN  oxyCODONE    IR 5 milliGRAM(s) Oral every 3 hours PRN  oxyCODONE    IR 5 milliGRAM(s) Oral once PRN    magnesium hydroxide Suspension 30 milliLiter(s) Oral two times a day PRN  senna 1 Tablet(s) Oral two times a day PRN  simethicone 80 milliGRAM(s) Chew every 4 hours PRN      ascorbic acid 500 milliGRAM(s) Oral daily  ferrous    sulfate 325 milliGRAM(s) Oral three times a day  lanolin Ointment 1 Application(s) Topical every 6 hours PRN      REVIEW OF SYSTEMS:      PHYSICAL EXAM:  General: NAD  Cardiovascular: Normal S1 S2, No JVD, No murmurs, No edema  Respiratory: Lungs clear to auscultation	  Gastrointestinal:  Soft, Non-tender, + BS	  Skin: warm and dry, No rashes, No ecchymoses, No cyanosis	  Extremities: Normal range of motion, No clubbing, cyanosis or edema  Vascular: Peripheral pulses palpable 2+ bilaterally    LABS:	 	              proBNP:   Lipid Profile:   HgA1c:   TSH:       CARDIAC MARKERS:            	   FOLLOW UP:  Severe Pre-eclampsia    SUBJECTIVE/OBSERVATIONS:  Patient seen and examined. She is feeling well with no complaints of pain. Blood pressure is within good limits    Vital Signs Last 24 Hrs  T(C): 36.7 (06 Apr 2025 05:18), Max: 37.2 (05 Apr 2025 14:22)  T(F): 98.1 (06 Apr 2025 05:18), Max: 98.9 (05 Apr 2025 14:22)  HR: 83 (06 Apr 2025 05:18) (81 - 90)  BP: 114/73 (06 Apr 2025 05:18) (114/73 - 134/87)  BP(mean): --  RR: 16 (06 Apr 2025 05:18) (16 - 19)  SpO2: 99% (06 Apr 2025 05:18) (99% - 100%)    Parameters below as of 06 Apr 2025 05:18  Patient On (Oxygen Delivery Method): room air      I&O's Summary      MEDICATIONS:  heparin   Injectable 5000 Unit(s) SubCutaneous every 12 hours  hydrALAZINE 50 milliGRAM(s) Oral every 8 hours  labetalol 600 milliGRAM(s) Oral three times a day  NIFEdipine XL 60 milliGRAM(s) Oral two times a day        acetaminophen   Oral Liquid .. 975 milliGRAM(s) Oral every 6 hours  diphenhydrAMINE 25 milliGRAM(s) Oral every 6 hours PRN  ibuprofen  Suspension. 600 milliGRAM(s) Oral every 6 hours  oxyCODONE    IR 5 milliGRAM(s) Oral once PRN  oxyCODONE    IR 5 milliGRAM(s) Oral every 3 hours PRN  oxyCODONE    IR 5 milliGRAM(s) Oral once PRN    magnesium hydroxide Suspension 30 milliLiter(s) Oral two times a day PRN  senna 1 Tablet(s) Oral two times a day PRN  simethicone 80 milliGRAM(s) Chew every 4 hours PRN      ascorbic acid 500 milliGRAM(s) Oral daily  ferrous    sulfate 325 milliGRAM(s) Oral three times a day  lanolin Ointment 1 Application(s) Topical every 6 hours PRN      REVIEW OF SYSTEMS:      PHYSICAL EXAM:  General: NAD  Cardiovascular: Normal S1 S2, No JVD, No murmurs, No edema  Respiratory: Lungs clear to auscultation	  Gastrointestinal:  Soft, Non-tender, + BS	  Skin: warm and dry, No rashes, No ecchymoses, No cyanosis	  Extremities: Normal range of motion, No clubbing, cyanosis or edema  Vascular: Peripheral pulses palpable 2+ bilaterally    LABS:	 	              proBNP:   Lipid Profile:   HgA1c:   TSH:       CARDIAC MARKERS:

## 2025-04-06 NOTE — PROGRESS NOTE ADULT - SUBJECTIVE AND OBJECTIVE BOX
Postpartum Note,  Section  She is a  24y woman who is now post-operative day: 8    Subjective:  The patient feels well.  She is ambulating.   She is tolerating regular diet.  She denies nausea and vomiting.  She is voiding.  Her pain is controlled.  She reports normal postpartum bleeding.  If cleared by Cardiology to discharge home.     Vital Signs Last 24 Hrs  T(C): 37.1 (2025 09:51), Max: 37.2 (2025 14:22)  T(F): 98.7 (2025 09:51), Max: 98.9 (2025 14:22)  HR: 81 (2025 09:51) (81 - 90)  BP: 129/80 (2025 09:51) (114/73 - 134/87)  BP(mean): --  RR: 15 (2025 09:51) (15 - 19)  SpO2: 98% (2025 09:51) (98% - 100%)    Parameters below as of 2025 09:51  Patient On (Oxygen Delivery Method): room air        Physical exam:  Gen: NAD  Breast: Soft, nontender, not engorged.  Abdomen: Soft, nontender, no distension , firm uterine fundus at umbilicus.  Incision: Clean, dry, and intact with steri strips  Pelvic: Normal lochia noted  Ext: No calf tenderness    LABS:        Allergies    No Known Allergies    Intolerances      MEDICATIONS  (STANDING):  acetaminophen   Oral Liquid .. 975 milliGRAM(s) Oral every 6 hours  ascorbic acid 500 milliGRAM(s) Oral daily  ferrous    sulfate 325 milliGRAM(s) Oral three times a day  heparin   Injectable 5000 Unit(s) SubCutaneous every 12 hours  hydrALAZINE 50 milliGRAM(s) Oral every 8 hours  ibuprofen  Suspension. 600 milliGRAM(s) Oral every 6 hours  labetalol 600 milliGRAM(s) Oral three times a day  NIFEdipine XL 60 milliGRAM(s) Oral two times a day    MEDICATIONS  (PRN):  diphenhydrAMINE 25 milliGRAM(s) Oral every 6 hours PRN Pruritus  lanolin Ointment 1 Application(s) Topical every 6 hours PRN Sore Nipples  magnesium hydroxide Suspension 30 milliLiter(s) Oral two times a day PRN Constipation  oxyCODONE    IR 5 milliGRAM(s) Oral once PRN Moderate to Severe Pain (4-10)  oxyCODONE    IR 5 milliGRAM(s) Oral every 3 hours PRN Moderate to Severe Pain (4-10)  oxyCODONE    IR 5 milliGRAM(s) Oral once PRN Moderate to Severe Pain (4-10)  senna 1 Tablet(s) Oral two times a day PRN Constipation  simethicone 80 milliGRAM(s) Chew every 4 hours PRN Gas        Assessment and Plan  POD # 8 s/p  section  Doing well.  Encourage ambulation.  F/U with PMD in 1 week

## 2025-04-06 NOTE — PROGRESS NOTE ADULT - ASSESSMENT
25y/o  POD#8 from pLTCS at 34w5d for failed IOL,  delivery d/t PEC w/ SF. Difficulty controlling BPs postpartum requiring transfer to CCU, now downgraded. Patient is stable.    #Anemia  - H&H trend as above  - s/p 2uPRBCs with adequate rise in H&H    #sPEC  - sp Mg  - Uptitrated to Pro 60/60, Lab 600 TID, and Hydral 50 TID  - TTE wnl  - cont to monitor pressures/symptoms   - Appreciate Cardiology recs    #Postpartum  - Continue with po analgesia  - Increase ambulation  - Continue regular diet  - Incision site c/d/i    Fatou Geiger, PGY-3

## 2025-04-07 ENCOUNTER — INPATIENT (INPATIENT)
Facility: HOSPITAL | Age: 25
LOS: 0 days | Discharge: HOME | End: 2025-04-08
Attending: OBSTETRICS & GYNECOLOGY | Admitting: OBSTETRICS & GYNECOLOGY
Payer: COMMERCIAL

## 2025-04-07 VITALS
RESPIRATION RATE: 17 BRPM | HEART RATE: 100 BPM | HEIGHT: 66 IN | DIASTOLIC BLOOD PRESSURE: 80 MMHG | OXYGEN SATURATION: 97 % | SYSTOLIC BLOOD PRESSURE: 129 MMHG | TEMPERATURE: 98 F

## 2025-04-07 PROCEDURE — 99285 EMERGENCY DEPT VISIT HI MDM: CPT

## 2025-04-07 NOTE — ED ADULT TRIAGE NOTE - CHIEF COMPLAINT QUOTE
Pt had a C section 3/29. Pt was [place] last night, began having bleeding to C section site 1 hour ago, reports a lot of bleeding to the site holding C section site with towel. denies any pain, headache, dizziness. pt stable and in no distress at this time. hx. preeclampsia  Pt to be seen in the ED- L&D called

## 2025-04-08 ENCOUNTER — TRANSCRIPTION ENCOUNTER (OUTPATIENT)
Age: 25
End: 2025-04-08

## 2025-04-08 VITALS
HEART RATE: 78 BPM | OXYGEN SATURATION: 100 % | RESPIRATION RATE: 16 BRPM | SYSTOLIC BLOOD PRESSURE: 132 MMHG | TEMPERATURE: 98 F | DIASTOLIC BLOOD PRESSURE: 95 MMHG

## 2025-04-08 DIAGNOSIS — S30.1XXA CONTUSION OF ABDOMINAL WALL, INITIAL ENCOUNTER: ICD-10-CM

## 2025-04-08 PROBLEM — G43.909 MIGRAINE, UNSPECIFIED, NOT INTRACTABLE, WITHOUT STATUS MIGRAINOSUS: Chronic | Status: ACTIVE | Noted: 2025-03-28

## 2025-04-08 LAB
ALBUMIN SERPL ELPH-MCNC: 3.3 G/DL — SIGNIFICANT CHANGE UP (ref 3.3–5)
ALP SERPL-CCNC: 135 U/L — HIGH (ref 40–120)
ALT FLD-CCNC: 16 U/L — SIGNIFICANT CHANGE UP (ref 4–33)
ANION GAP SERPL CALC-SCNC: 13 MMOL/L — SIGNIFICANT CHANGE UP (ref 7–14)
APTT BLD: 26.5 SEC — SIGNIFICANT CHANGE UP (ref 24.5–35.6)
AST SERPL-CCNC: 16 U/L — SIGNIFICANT CHANGE UP (ref 4–32)
BILIRUB SERPL-MCNC: 0.4 MG/DL — SIGNIFICANT CHANGE UP (ref 0.2–1.2)
BLD GP AB SCN SERPL QL: NEGATIVE — SIGNIFICANT CHANGE UP
BUN SERPL-MCNC: 31 MG/DL — HIGH (ref 7–23)
CALCIUM SERPL-MCNC: 8.9 MG/DL — SIGNIFICANT CHANGE UP (ref 8.4–10.5)
CHLORIDE SERPL-SCNC: 105 MMOL/L — SIGNIFICANT CHANGE UP (ref 98–107)
CO2 SERPL-SCNC: 21 MMOL/L — LOW (ref 22–31)
CREAT SERPL-MCNC: 0.67 MG/DL — SIGNIFICANT CHANGE UP (ref 0.5–1.3)
EGFR: 124 ML/MIN/1.73M2 — SIGNIFICANT CHANGE UP
EGFR: 124 ML/MIN/1.73M2 — SIGNIFICANT CHANGE UP
GLUCOSE SERPL-MCNC: 88 MG/DL — SIGNIFICANT CHANGE UP (ref 70–99)
HCT VFR BLD CALC: 32.7 % — LOW (ref 34.5–45)
HGB BLD-MCNC: 10.6 G/DL — LOW (ref 11.5–15.5)
INR BLD: <0.9 RATIO — LOW (ref 0.85–1.16)
MCHC RBC-ENTMCNC: 29.1 PG — SIGNIFICANT CHANGE UP (ref 27–34)
MCHC RBC-ENTMCNC: 32.4 G/DL — SIGNIFICANT CHANGE UP (ref 32–36)
MCV RBC AUTO: 89.8 FL — SIGNIFICANT CHANGE UP (ref 80–100)
NRBC # BLD AUTO: 0 K/UL — SIGNIFICANT CHANGE UP (ref 0–0)
NRBC # FLD: 0 K/UL — SIGNIFICANT CHANGE UP (ref 0–0)
NRBC BLD AUTO-RTO: 0 /100 WBCS — SIGNIFICANT CHANGE UP (ref 0–0)
PLATELET # BLD AUTO: 369 K/UL — SIGNIFICANT CHANGE UP (ref 150–400)
POTASSIUM SERPL-MCNC: 4.1 MMOL/L — SIGNIFICANT CHANGE UP (ref 3.5–5.3)
POTASSIUM SERPL-SCNC: 4.1 MMOL/L — SIGNIFICANT CHANGE UP (ref 3.5–5.3)
PROT SERPL-MCNC: 6.2 G/DL — SIGNIFICANT CHANGE UP (ref 6–8.3)
PROTHROM AB SERPL-ACNC: 10 SEC — SIGNIFICANT CHANGE UP (ref 9.9–13.4)
RBC # BLD: 3.64 M/UL — LOW (ref 3.8–5.2)
RBC # FLD: 15.3 % — HIGH (ref 10.3–14.5)
RH IG SCN BLD-IMP: POSITIVE — SIGNIFICANT CHANGE UP
SODIUM SERPL-SCNC: 139 MMOL/L — SIGNIFICANT CHANGE UP (ref 135–145)
WBC # BLD: 13.64 K/UL — HIGH (ref 3.8–10.5)
WBC # FLD AUTO: 13.64 K/UL — HIGH (ref 3.8–10.5)

## 2025-04-08 PROCEDURE — 74177 CT ABD & PELVIS W/CONTRAST: CPT | Mod: 26

## 2025-04-08 PROCEDURE — 99285 EMERGENCY DEPT VISIT HI MDM: CPT

## 2025-04-08 RX ORDER — ACETAMINOPHEN 500 MG/5ML
1000 LIQUID (ML) ORAL EVERY 6 HOURS
Refills: 0 | Status: DISCONTINUED | OUTPATIENT
Start: 2025-04-08 | End: 2025-04-08

## 2025-04-08 RX ORDER — CEPHALEXIN 250 MG/1
1 CAPSULE ORAL
Qty: 20 | Refills: 0
Start: 2025-04-08 | End: 2025-04-17

## 2025-04-08 RX ORDER — LABETALOL HYDROCHLORIDE 200 MG/1
600 TABLET, FILM COATED ORAL EVERY 8 HOURS
Refills: 0 | Status: DISCONTINUED | OUTPATIENT
Start: 2025-04-08 | End: 2025-04-08

## 2025-04-08 RX ORDER — KETOROLAC TROMETHAMINE 30 MG/ML
30 INJECTION, SOLUTION INTRAMUSCULAR; INTRAVENOUS ONCE
Refills: 0 | Status: DISCONTINUED | OUTPATIENT
Start: 2025-04-08 | End: 2025-04-08

## 2025-04-08 RX ORDER — KETOROLAC TROMETHAMINE 30 MG/ML
15 INJECTION, SOLUTION INTRAMUSCULAR; INTRAVENOUS EVERY 6 HOURS
Refills: 0 | Status: DISCONTINUED | OUTPATIENT
Start: 2025-04-08 | End: 2025-04-08

## 2025-04-08 RX ORDER — NIFEDIPINE 30 MG
60 TABLET, EXTENDED RELEASE 24 HR ORAL EVERY 12 HOURS
Refills: 0 | Status: DISCONTINUED | OUTPATIENT
Start: 2025-04-08 | End: 2025-04-08

## 2025-04-08 RX ORDER — SODIUM CHLORIDE 9 G/1000ML
1000 INJECTION, SOLUTION INTRAVENOUS
Refills: 0 | Status: DISCONTINUED | OUTPATIENT
Start: 2025-04-08 | End: 2025-04-08

## 2025-04-08 RX ORDER — ACETAMINOPHEN 500 MG/5ML
1000 LIQUID (ML) ORAL ONCE
Refills: 0 | Status: COMPLETED | OUTPATIENT
Start: 2025-04-08 | End: 2025-04-08

## 2025-04-08 RX ORDER — LABETALOL HYDROCHLORIDE 200 MG/1
600 TABLET, FILM COATED ORAL ONCE
Refills: 0 | Status: COMPLETED | OUTPATIENT
Start: 2025-04-08 | End: 2025-04-08

## 2025-04-08 RX ADMIN — Medication 1000 MILLIGRAM(S): at 09:11

## 2025-04-08 RX ADMIN — Medication 50 MILLIGRAM(S): at 04:41

## 2025-04-08 RX ADMIN — Medication 60 MILLIGRAM(S): at 10:01

## 2025-04-08 RX ADMIN — Medication 400 MILLIGRAM(S): at 09:15

## 2025-04-08 RX ADMIN — Medication 1000 MILLIGRAM(S): at 11:49

## 2025-04-08 RX ADMIN — Medication 400 MILLIGRAM(S): at 12:43

## 2025-04-08 RX ADMIN — LABETALOL HYDROCHLORIDE 600 MILLIGRAM(S): 200 TABLET, FILM COATED ORAL at 14:57

## 2025-04-08 RX ADMIN — Medication 400 MILLIGRAM(S): at 06:51

## 2025-04-08 RX ADMIN — SODIUM CHLORIDE 125 MILLILITER(S): 9 INJECTION, SOLUTION INTRAVENOUS at 07:39

## 2025-04-08 RX ADMIN — Medication 50 MILLIGRAM(S): at 15:38

## 2025-04-08 RX ADMIN — KETOROLAC TROMETHAMINE 30 MILLIGRAM(S): 30 INJECTION, SOLUTION INTRAMUSCULAR; INTRAVENOUS at 06:51

## 2025-04-08 RX ADMIN — KETOROLAC TROMETHAMINE 30 MILLIGRAM(S): 30 INJECTION, SOLUTION INTRAMUSCULAR; INTRAVENOUS at 04:45

## 2025-04-08 RX ADMIN — LABETALOL HYDROCHLORIDE 600 MILLIGRAM(S): 200 TABLET, FILM COATED ORAL at 04:41

## 2025-04-08 NOTE — DISCHARGE NOTE PROVIDER - NSDCCPCAREPLAN_GEN_ALL_CORE_FT
PRINCIPAL DISCHARGE DIAGNOSIS  Diagnosis: Rectus sheath hematoma  Assessment and Plan of Treatment:

## 2025-04-08 NOTE — ED ADULT NURSE REASSESSMENT NOTE - PAIN RATING/NUMBER SCALE (0-10): ACTIVITY
7 (severe pain)
0 (no pain/absence of nonverbal indicators of pain)
0 (no pain/absence of nonverbal indicators of pain)

## 2025-04-08 NOTE — ED ADULT NURSE REASSESSMENT NOTE - NS ED NURSE REASSESS COMMENT FT1
pt requesting pain meds prior to wound re-packing by GYN pt originally declined IV pain medicine but is now requesting pain meds prior to wound re-packing by GYN pt originally declined IV pain medicine but is now requesting pain meds prior to wound re-packing by GYN. pt also requesting morning HTN meds. ED MDs aware, awaiting further orders.

## 2025-04-08 NOTE — ED ADULT NURSE REASSESSMENT NOTE - COMFORT CARE
plan of care explained/warm blanket provided
darkened lights/plan of care explained
plan of care explained

## 2025-04-08 NOTE — DISCHARGE NOTE PROVIDER - NSDCMRMEDTOKEN_GEN_ALL_CORE_FT
blood pressure monitor: Electronic Blood Pressure monitor x1.  Please check your blood pressure 3x/day. Notify your doctor for blood pressure readings 140/90 or greater. Return to hospital for blood pressures greater than 160/110  cephalexin 500 mg oral tablet: 1 tab(s) orally every 12 hours  ferrous sulfate 325 mg (65 mg elemental iron) oral tablet: 1 tab(s) orally 3 times a day  hydrALAZINE 50 mg oral tablet: 1 tab(s) orally every 8 hours Please take your blood pressure before taking the medication. Please do not take the Hydralazine if the blood pressure less than 110 or less than 60.  labetalol 300 mg oral tablet: 2 tab(s) orally 3 times a day Please take your blood pressure before taking the medication. Please do not take the Labetalol if the blood pressure less than 110 or less than 60.  NIFEdipine 60 mg oral tablet, extended release: 1 tab(s) orally 2 times a day Please take your blood pressure before taking the medication. Please do not take the Procardia if the blood pressure less than 110 or less than 60.

## 2025-04-08 NOTE — ED ADULT NURSE REASSESSMENT NOTE - NS ED NURSE REASSESS COMMENT FT1
pt A&OX4 resting comfortably in bed, easily arousable to verbal stimuli. respirations even and non labore.d pt admitted to OBGYN for rectus sheath hematoma. NPO at this time.

## 2025-04-08 NOTE — ED PROVIDER NOTE - CLINICAL SUMMARY MEDICAL DECISION MAKING FREE TEXT BOX
25-year-old female had a  done on 3/29, on blood pressure medication for preeclampsia comes into the ED today for evaluation of significant mount of bleeding from the surgical scar about 1 hour prior to arrival in the ED.  Since discharge she states she has had no significant complaints.  Her pain around the surgical site has been gradually improving, she has not had any fevers or chills, no abdominal distention, and no discharge from surgical site.  Today she was showering and towards the end of the shower she suddenly started to have a significant amount of bleeding.  She reports soaking through 2 towels.  She denies any symptoms of acute anemia.      Currently in the emergency department there is no longer any bleeding from the surgical sites.      There is a small area of clotted blood that is about 1 cm in length at the last lateralmost aspect of the surgical scar.  She otherwise has no abdominal tenderness and the rest of the surgical scar appears to be healing well.      Will order basic labs and consult OB.  Patient is very well-appearing will likely be discharged home. 25-year-old female had a  done on 3/29, on blood pressure medication for preeclampsia comes into the ED today for evaluation of significant mount of bleeding from the surgical scar about 1 hour prior to arrival in the ED.  Since discharge she states she has had no significant complaints.  Her pain around the surgical site has been gradually improving, she has not had any fevers or chills, no abdominal distention, and no discharge from surgical site.  Today she was showering and towards the end of the shower she suddenly started to have a significant amount of bleeding.  She reports soaking through 2 towels.  She denies any symptoms of acute anemia.      Currently in the emergency department there is no longer any bleeding from the surgical sites.      There is a small area of clotted blood that is about 1 cm in length at the last lateralmost aspect of the surgical scar.  She otherwise has no abdominal tenderness and the rest of the surgical scar appears to be healing well.      Will order basic labs and consult OB.  Patient is very well-appearing.

## 2025-04-08 NOTE — H&P ADULT - HISTORY OF PRESENT ILLNESS
ELVIRA RODRIGES  25y  Female [id number]    24yo  POD#11 s/p pLTCS for failed IOL s/p sPEC/Mg meeting criteria with severe range BPs presents with surgical wound site bleeding. Pt reports she was showering at 11pm when she noticed the left side of her incision bleeding profusely. She tried hold pressure with a towel but states that bleeding continued. She denies any recent trauma to the area. She denies any fevers, chills, SOB, CP, bowel or urinary changes    Name of GYN Physician: Dr. Tomlinson    POB: P1. Postpartem course complicated by uncontrolled severe range BPs requiring CCU admission (04/3-)  Pgyn: Denies fibroids, cysts, endometriosis, STI's, Abnormal pap smears   PMHX: migraines  PSHx: umbilical hernia repair, C/Sx1  Meds: Labetalol 600mg TID, Hydral 50mg BID, Procardia 60mg BID  All: NKDA  Social History:  Denies smoking use, drug use, alcohol use.     Vital Signs Last 24 Hrs  T(C): 36.7 (2025 04:25), Max: 36.7 (2025 04:25)  T(F): 98 (2025 04:25), Max: 98 (2025 04:25)  HR: 83 (2025 04:25) (83 - 100)  BP: 137/98 (2025 04:25) (129/80 - 137/98)  BP(mean): --  RR: 18 (2025 04:25) (17 - 18)  SpO2: 100% (2025 04:25) (97% - 100%)    Parameters below as of 2025 04:25  Patient On (Oxygen Delivery Method): room air

## 2025-04-08 NOTE — CONSULT NOTE ADULT - ASSESSMENT
24yo  POD#10 s/p pLTCS for failed IOL s/p sPEC/Mg meeting criteria with severe range BPs presents with surgical wound site bleeding. Pt afebrile, vitals stable. Physical exam remarkable for 0.5cm defect at left corner of incision draining dark red blood, approximately 80ccs expressed. Differential includes hematoma vs seroma vs abscess    #Superficial wound dehiscence  - Obtain CBC  - f/u CTAP  - F/u Wound cx      Ami Cooper PGY2   D/w Dr. Downey 26yo  POD#10 s/p pLTCS for failed IOL s/p sPEC/Mg meeting criteria with severe range BPs presents with surgical wound site bleeding. Pt afebrile, vitals stable. Physical exam remarkable for 0.5cm defect at left corner of incision draining dark red blood, approximately 80ccs expressed. Defect tracks approx 4cm to the right and fascia appears to be intact. Iodoform packing placed. Differential includes hematoma vs seroma vs abscess.    #Superficial wound dehiscence  - Obtain CBC  - f/u CTAP  - F/u Wound cx      Amiradha Cooper PGY2   D/w Dr. Downey

## 2025-04-08 NOTE — ED ADULT NURSE REASSESSMENT NOTE - AS PAIN REST
0 (no pain/absence of nonverbal indicators of pain)
0 (no pain/absence of nonverbal indicators of pain)
7 (severe pain)

## 2025-04-08 NOTE — DISCHARGE NOTE PROVIDER - CARE PROVIDER_API CALL
Zaida Salinas  Obstetrics and Gynecology  [address], [address]  [place] [address]  Phone: [phone]  Fax: [phone]  Scheduled Appointment: 04/15/2025

## 2025-04-08 NOTE — CONSULT NOTE ADULT - SUBJECTIVE AND OBJECTIVE BOX
ELVIRA RODRIGES  25y  Female [id number]    HPI:   26yo  POD#10 s/p pLTCS for failed IOL s/p sPEC/Mg meeting criteria with severe range BPs presents with surgical wound site bleeding. Pt reports she was showering at 11pm when she noticed the left side of her incision bleeding profusely. She tried hold pressure with a towel but states that bleeding continued. She denies any recent trauma to the area. She denies any fevers, chills, SOB, CP, bowel or urinary changes        Name of GYN Physician: Dr. Tomlinson    POB: P1  Pgyn: Denies fibroids, cysts, endometriosis, STI's, Abnormal pap smears   PMHX: migraines  PSHx: umbilical hernia repair, C/Sx1  Meds: Labetalol 300mg TID, Hydral 50mg BID, Procardia 60mg BID  All: NKDA  Social History:  Denies smoking use, drug use, alcohol use.     Vital Signs Last 24 Hrs  T(C): 36.5 (2025 23:13), Max: 36.5 (2025 23:13)  T(F): 97.7 (2025 23:13), Max: 97.7 (2025 23:13)  HR: 100 (2025 23:13) (100 - 100)  BP: 129/80 (2025 23:13) (129/80 - 129/80)  BP(mean): --  RR: 17 (2025 23:13) (17 - 17)  SpO2: 97% (2025 23:13) (97% - 97%)    Parameters below as of 2025 23:13  Patient On (Oxygen Delivery Method): room air        Physical Exam:   General: sitting comfortably in bed, NAD   HEENT: neck supple, full ROM  CV: Well perfused  Lungs: saturating well on RA   Back: No CVA tenderness  Abd: Soft, non-tender, non-distended.  Incision: Phannensteil incision, with steri-strips atop. 0.5cm defect at left corner of incision draining dark red blood, approximately 80ccs expressed.             RADIOLOGY & ADDITIONAL STUDIES: ELVIRA RODRIGES  25y  Female [id number]    HPI:   26yo  POD#10 s/p pLTCS for failed IOL s/p sPEC/Mg meeting criteria with severe range BPs presents with surgical wound site bleeding. Pt reports she was showering at 11pm when she noticed the left side of her incision bleeding profusely. She tried hold pressure with a towel but states that bleeding continued. She denies any recent trauma to the area. She denies any fevers, chills, SOB, CP, bowel or urinary changes        Name of GYN Physician: Dr. Tomlinson    POB: P1. Postpartem course complicated by uncontrolled severe range BPs requiring CCU admission (04/3-)  Pgyn: Denies fibroids, cysts, endometriosis, STI's, Abnormal pap smears   PMHX: migraines  PSHx: umbilical hernia repair, C/Sx1  Meds: Labetalol 600mg TID, Hydral 50mg BID, Procardia 60mg BID  All: NKDA  Social History:  Denies smoking use, drug use, alcohol use.     Vital Signs Last 24 Hrs  T(C): 36.5 (2025 23:13), Max: 36.5 (2025 23:13)  T(F): 97.7 (2025 23:13), Max: 97.7 (2025 23:13)  HR: 100 (2025 23:13) (100 - 100)  BP: 129/80 (2025 23:13) (129/80 - 129/80)  BP(mean): --  RR: 17 (2025 23:13) (17 - 17)  SpO2: 97% (2025 23:13) (97% - 97%)    Parameters below as of 2025 23:13  Patient On (Oxygen Delivery Method): room air        Physical Exam:   General: sitting comfortably in bed, NAD   HEENT: neck supple, full ROM  CV: Well perfused  Lungs: saturating well on RA   Back: No CVA tenderness  Abd: Soft, non-tender, non-distended.  Incision: Pfannenstiel incision, with steri-strips atop. 0.5cm defect at left corner of incision draining dark red blood, approximately 80ccs expressed. Rest of incision appear intact  wound culture collected           RADIOLOGY & ADDITIONAL STUDIES: ELVIRA RODRIGES  25y  Female [id number]    HPI:   24yo  POD#10 s/p pLTCS for failed IOL s/p sPEC/Mg meeting criteria with severe range BPs presents with surgical wound site bleeding. Pt reports she was showering at 11pm when she noticed the left side of her incision bleeding profusely. She tried hold pressure with a towel but states that bleeding continued. She denies any recent trauma to the area. She denies any fevers, chills, SOB, CP, bowel or urinary changes    Name of GYN Physician: Dr. Tomlinson    POB: P1. Postpartem course complicated by uncontrolled severe range BPs requiring CCU admission (04/3-)  Pgyn: Denies fibroids, cysts, endometriosis, STI's, Abnormal pap smears   PMHX: migraines  PSHx: umbilical hernia repair, C/Sx1  Meds: Labetalol 600mg TID, Hydral 50mg BID, Procardia 60mg BID  All: NKDA  Social History:  Denies smoking use, drug use, alcohol use.     Vital Signs Last 24 Hrs  T(C): 36.5 (2025 23:13), Max: 36.5 (2025 23:13)  T(F): 97.7 (2025 23:13), Max: 97.7 (2025 23:13)  HR: 100 (2025 23:13) (100 - 100)  BP: 129/80 (2025 23:13) (129/80 - 129/80)  BP(mean): --  RR: 17 (2025 23:13) (17 - 17)  SpO2: 97% (2025 23:13) (97% - 97%)    Parameters below as of 2025 23:13  Patient On (Oxygen Delivery Method): room air      [place]: Dr. Hannah Adam  Physical Exam:   General: sitting comfortably in bed, NAD   HEENT: neck supple, full ROM  CV: Well perfused  Lungs: saturating well on RA   Back: No CVA tenderness  Abd: Soft, non-tender, non-distended.  Incision: Pfannenstiel incision, with steri-strips atop. 0.5cm defect at left corner of incision draining dark red blood, approximately 80ccs expressed. Defect tracks ~4cm laterally to the right of the incision. Fascia appears to be intact  1/4inch Iodoform packing placed, [id number] dressing placed atop  Rest of incision intact and healing well.   wound culture collected           RADIOLOGY & ADDITIONAL STUDIES:

## 2025-04-08 NOTE — H&P ADULT - NSHPLABSRESULTS_GEN_ALL_CORE
LABS:                              10.6   13.64 )-----------( 369      ( 2025 00:50 )             32.7         139  |  105  |  31[H]  ----------------------------<  88  4.1   |  21[L]  |  0.67    Ca    8.9      2025 00:50    TPro  6.2  /  Alb  3.3  /  TBili  0.4  /  DBili  x   /  AST  16  /  ALT  16  /  AlkPhos  135[H]      I&O's Detail    PT/INR - ( 2025 01:00 )   PT: 10.0 sec;   INR: <0.90 ratio         PTT - ( 2025 01:00 )  PTT:26.5 sec  Urinalysis Basic - ( 2025 00:50 )    Color: x / Appearance: x / SG: x / pH: x  Gluc: 88 mg/dL / Ketone: x  / Bili: x / Urobili: x   Blood: x / Protein: x / Nitrite: x   Leuk Esterase: x / RBC: x / WBC x   Sq Epi: x / Non Sq Epi: x / Bacteria: x        RADIOLOGY & ADDITIONAL STUDIES:    < from: CT Abdomen and Pelvis w/ IV Cont (25 @ 04:05) >    PROCEDURE DATE:  2025          INTERPRETATION:  CLINICAL INFORMATION: Bleeding from the surgical site.    COMPARISON: None.    CONTRAST/COMPLICATIONS:  IV Contrast: Omnipaque 350  90 cc administered   10 cc discarded  Oral Contrast: NONE  .    PROCEDURE:  CT of the Abdomen and Pelvis was performed.  Sagittal and coronal reformats were performed.    FINDINGS:  LOWER CHEST: Within normal limits.    LIVER: Within normal limits.  BILE DUCTS: Normal caliber.  GALLBLADDER: Within normal limits.  SPLEEN: Within normal limits.  PANCREAS: Within normal limits.  ADRENALS: Within normal limits.  KIDNEYS/URETERS: Within normal limits.    BLADDER: Within normal limits.  REPRODUCTIVE ORGANS: Post  changes in the lower uterine segment.   No evidence for bladder flap hematoma or collection.    BOWEL: No bowel obstruction. Appendix is normal.  PERITONEUM/RETROPERITONEUM: Within normal limits.  VESSELS: Within normal limits.  LYMPH NODES: No lymphadenopathy.  ABDOMINAL WALL: Postsurgical change in the ventral abdominal wall status   post Pfannenstiel incision. Hematoma of the rectus sheath which measures   up to 10.4 x 4.0, containing punctate focus of gas.  BONES: Within normal limits.    IMPRESSION:  Post  change in the ventral abdominal wall along with a large   rectus sheath hematoma.        --- End of Report ---            SHAGGY MCMAHON MD; Attending Radiologist  This document has been electronically signed. 2025  4:42AM    < end of copied text >

## 2025-04-08 NOTE — DISCHARGE NOTE NURSING/CASE MANAGEMENT/SOCIAL WORK - FINANCIAL ASSISTANCE
[facility] provides services at a reduced cost to those who are determined to be eligible through [facility]’s financial assistance program. Information regarding [facility]’s financial assistance program can be found by going to [email or web address] or by calling [phone].

## 2025-04-08 NOTE — CONSULT NOTE ADULT - ASSESSMENT
26yo  POD#11 s/p pLTCS for failed IOL s/p sPEC/Mg meeting criteria with severe range BPs, presented to ED with surgical wound site bleeding. Seen by GYN, wound defect noted to be 0.5cm, approx 80cc dark red blood was expressed. CT A/P was performed demonstrating large rectus sheath hematoma, IR consulted for drainage.     - Case and imaging reviewed w/ IR attending Dr. Miles  - CT demonstrating high density hematoma, no apparent fluid portion identified for drainage   - Would recommend conservative management with wound care and abdominal binder   - If symptoms persist, would recommend US abdomen to evaluate for drainable collection     [id number]

## 2025-04-08 NOTE — DISCHARGE NOTE PROVIDER - HOSPITAL COURSE
Pt presented on POD#11 s/p LTCS complicated by sPEC with bleeding from her incision, found to have rectus sheath hematoma. IR consulted, recommend conservative management. Pt seen by wound care and discharged on HD#1. Pt to have close follow up in 1 week. Pt discharged on PO Keflex x10d.

## 2025-04-08 NOTE — ED ADULT NURSE REASSESSMENT NOTE - GENERAL PATIENT STATE
comfortable appearance/resting/sleeping
comfortable appearance/family/SO at bedside/resting/sleeping
anxious/comfortable appearance/family/SO at bedside/resting/sleeping

## 2025-04-08 NOTE — ED ADULT NURSE NOTE - OBJECTIVE STATEMENT
A&Ox4. Past medical history migraines . Presents to the ED for bleeding from the surgical scar about 1 hour prior to arrival in the ED. Pt recently had a  and was discharged yesterday. Pt states during stay she received a blood transfusion because he hemoglobin was low.  Denies CP, SOB, or N/V/D. Respirations even and unlabored. 20 gauge IV placed in right AC. Labs obtained. Awaiting diagnostic testing. Safety precautions in place.

## 2025-04-08 NOTE — ED PROVIDER NOTE - ATTENDING CONTRIBUTION TO CARE
25-year-old female, denies past medical history, recently status post  on 3/29 for pregnancy complicated by preeclampsia, presents to ED today for evaluation of surgical site dehiscence.  Patient reports 1 hour prior to arrival was in shower when noted significant bleeding from her surgical scar.  Patient denied any abdominal pain, purulent discharge, fever/chills, erythema/rash, lightheadedness/dizziness, weakness/numbness or any other symptoms at this time.      Vital signs stable.  Physical exam significant for well-appearing female, no acute distress.  Head is normocephalic/atraumatic.  Extraocular movements intact.  Pupils equal round reactive to light.  No conjunctival pallor.  Heart is regular rate and rhythm.  Lungs clear to oscillation bilaterally.  Abdomen is soft and nontender/nondistended.   wound site with Steri-Strips in place, left edge with mild oozing of blood.  Otherwise no erythema/fluctuance/purulence noted.  Wound appears to be healing well.  Rest of exam is unremarkable.    History and physical consistent with wound dehiscence of  surgical site.  Low suspicion for seroma versus abscess versus hematoma.  Plan to check basic labs to assess for critical anemia.  OB GYN consulted immediately for clinical evaluation.  Follow pending results and OB recs. 25-year-old female, denies past medical history, recently status post  on 3/29 for pregnancy complicated by preeclampsia, presents to ED today for evaluation of surgical site dehiscence.  Patient reports 1 hour prior to arrival was in shower when noted significant bleeding from her surgical scar.  Patient denied any abdominal pain, purulent discharge, fever/chills, erythema/rash, lightheadedness/dizziness, weakness/numbness or any other symptoms at this time.      Vital signs stable.  Physical exam significant for well-appearing female, no acute distress.  Head is normocephalic/atraumatic.  Extraocular movements intact.  Pupils equal round reactive to light.  No conjunctival pallor.  Heart is regular rate and rhythm.  Lungs clear to oscillation bilaterally.  Abdomen is soft and nontender/nondistended.   wound site with Steri-Strips in place, left edge with mild oozing of blood.  Otherwise no erythema/fluctuance/purulence noted.  Wound appears to be healing well.  Rest of exam is unremarkable.    History and physical consistent with wound dehiscence of  surgical site. Possible underlying hematoma.  Low suspicion for seroma versus abscess.  Plan to check basic labs to assess for critical anemia.  OB GYN consulted immediately for clinical evaluation.  Follow pending results and OB recs.

## 2025-04-08 NOTE — H&P ADULT - NSHPPHYSICALEXAM_GEN_ALL_CORE
Chaperone: Dr. Hannah Adam  Physical Exam:   General: sitting comfortably in bed, NAD   HEENT: neck supple, full ROM  CV: Well perfused  Lungs: saturating well on RA   Back: No CVA tenderness  Abd: Soft, non-tender, non-distended.  Incision: Pfannenstiel incision, with steri-strips atop. 0.5cm defect at left corner of incision draining dark red blood, approximately 80ccs expressed. Defect tracks ~4cm laterally to the right of the incision. Fascia appears to be intact  1/4inch Iodoform packing placed, [id number] dressing placed atop  Rest of incision intact and healing well.   wound culture collected

## 2025-04-08 NOTE — H&P ADULT - ATTENDING COMMENTS
Gyn attending    Patient seen by me  VSS afebrile  agree with above  incision packed -likely draining hematoma no erythema no exudate    a/p s/p c/s sPEC with 10cm rectus sheath hematoma likely draining from wound-  afebrile  WBC decreasing from admission  H/H improved from admission  aquacel packing  IR consult- if declined consider d/c home with home care  no antibiotics at this time as no evidence of infection      Jacky GOMES

## 2025-04-08 NOTE — DISCHARGE NOTE PROVIDER - NSDCFUSCHEDAPPT_GEN_ALL_CORE_FT
[facility] [address]  Scheduled Appointment: 04/15/2025

## 2025-04-08 NOTE — ED PROVIDER NOTE - PHYSICAL EXAMINATION
GENERAL: Not in acute distress, non-toxic appearing  HEAD: normocephalic, atraumatic  HEENT: EOMI, normal conjunctiva, oral mucosa moist, neck supple  CARDIAC: appears well perfused  PULM: normal work of breathing  GI:  c section scar with clotted blood about 1cm in length at the left lateral most aspect, otherwise healing well, no abd tenderness, distension.   NEURO: alert and oriented x 3, normal speech, no focal motor or sensory deficits, gait normal, no gross neurologic deficit  MSK: No visible deformities, no peripheral edema,   SKIN: No visible rashes, dry, well-perfused, see abd exam  PSYCH: appropriate mood and affect

## 2025-04-08 NOTE — ADVANCED PRACTICE NURSE CONSULT - REASON FOR CONSULT
Patient seen on skin care rounds after wound care referral received for assessment of skin impairment and recommendations of topical management of the midline transverse abdomen. As per h&P, patient is a 26yo  POD#11 s/p pLTCS for failed IOL s/p sPEC/Mg meeting criteria with severe range BPs presents with surgical wound site bleeding. Pt reports she was showering at 11pm when she noticed the left side of her incision bleeding profusely. She tried hold pressure with a towel but states that bleeding continued. She denies any recent trauma to the area. She denies any fevers, chills, SOB, CP, bowel or urinary changes

## 2025-04-08 NOTE — ED PROVIDER NOTE - PROGRESS NOTE DETAILS
Lito Thrasher PGY3:  Spoke with Ob who will come and eval the Pt. MD Beaulieu: Pt seen by GYN. D/t concern for possible underlying hematoma, requesting CTAP. Will order. Will reassess pending result. Lito Thrasher PGY3:  Spoke with OB they would like to admit the Pt for an IR consult to see if this rectus sheath hematoma can be drained. Updated Pt and family who is at bedside. Answered all questions.

## 2025-04-08 NOTE — DISCHARGE NOTE NURSING/CASE MANAGEMENT/SOCIAL WORK - NSDCVIVACCINE_GEN_ALL_CORE_FT
Tdap; 01-Apr-2025 09:36; Reece Nuñez (BRUCE); [employer]; [id number] (Exp. Date: 01-Aug-2026); IntraMuscular; Deltoid Right.; 0.5 milliLiter(s); VIS (VIS Published: 09-May-2013, VIS Presented: 01-Apr-2025);

## 2025-04-08 NOTE — ED ADULT NURSE REASSESSMENT NOTE - NS ED NURSE REASSESS COMMENT FT1
Report received from RN Aman, A&Ox4, resting in stretcher, RR even and unlabored on RA. VS obtained. medicated for pain. pending dc. safety maintained.

## 2025-04-08 NOTE — ADVANCED PRACTICE NURSE CONSULT - ASSESSMENT
General: A&Ox4, OOB standby, continent of urine and stool. Skin warm, dry with increased moisture in intertriginous folds, adequate skin turgor, scattered areas of hyperpigmentation and hypopigmentation, scattered areas of ecchymosis on bilateral upper extremities with no hematomas. Blanchable erythema on bilateral heels.     L lateral midline transverse abdomen: Small area of dehiscence noted to L lateral aspect of transverse abdomen. Wound measuring approximately 0.5cmx0.3cmx0.4cm with tunneling to 9 o'clock extending 3.3cm. Wound friable with assessment and cleansing, small sanguinous drainage with no odor. Periwound intact. Rest of incision well approximated with no evidence of infection or dehiscence noted. No induration, no erythema, no edema, no temperature changes, no overt signs of infection noted.

## 2025-04-08 NOTE — PROGRESS NOTE ADULT - SUBJECTIVE AND OBJECTIVE BOX
OB Postpartum Note:  Delivery    S: 26yo now POD #11 s/p LTCS c/b sPEC admitted with rectus sheath hematoma found after pt presented with surgical wound bleeding. Pt was seen by wound care earlier today and had her wound packed. She denies fever/chills. Her pain is well controlled. She is tolerating a regular diet and passing flatus. Voiding spontaneously and ambulating without difficulty. Denies Nausea/vomiting/CP/SOB/lightheadedness/dizziness/headache/epigastric pain/changes in vision.    O:   Vitals:  Vital Signs Last 24 Hrs  T(C): 36.7 (2025 12:34), Max: 36.7 (2025 04:25)  T(F): 98 (2025 12:34), Max: 98 (2025 04:25)  HR: 78 (2025 12:34) (78 - 100)  BP: 136/96 (2025 12:34) (116/86 - 137/98)  BP(mean): --  RR: 16 (2025 12:34) (16 - 18)  SpO2: 100% (2025 12:34) (97% - 100%)    Parameters below as of 2025 12:34  Patient On (Oxygen Delivery Method): room air    PE:  General: NAD, patient resting comfortably in bed  Abdomen: not distended, soft  Incision: Clean, dry. Covered in dressing placed by wound care.  Extremities: SCDs in place, no erythema    MEDICATIONS  (STANDING):  acetaminophen   IVPB .. 1000 milliGRAM(s) IV Intermittent every 6 hours  hydrALAZINE 50 milliGRAM(s) Oral every 12 hours  labetalol 600 milliGRAM(s) Oral every 8 hours  lactated ringers. 1000 milliLiter(s) (125 mL/Hr) IV Continuous <Continuous>  NIFEdipine XL 60 milliGRAM(s) Oral every 12 hours    MEDICATIONS  (PRN):  ketorolac   Injectable 15 milliGRAM(s) IV Push every 6 hours PRN Severe Pain (7 - 10)      Labs:  Blood type: A Positive  Rubella IgG: RPR: Negative                          10.6[L]   13.64[H] >-----------< 369    (  @ 00:50 )             32.7[L]    25 @ 00:50      139  |  105  |  31[H]  ----------------------------<  88  4.1   |  21[L]  |  0.67        Ca    8.9      2025 00:50    TPro  6.2  /  Alb  3.3  /  TBili  0.4  /  DBili  x   /  AST  16  /  ALT  16  /  AlkPhos  135[H]  25 @ 00:50

## 2025-04-08 NOTE — DISCHARGE NOTE NURSING/CASE MANAGEMENT/SOCIAL WORK - PATIENT PORTAL LINK FT
You can access the FollowMyHealth Patient Portal offered by [facility] by registering at the following website: [email or web address]. By joining [facility]’s FollowMyHealth portal, you will also be able to view your health information using other applications (apps) compatible with our system.

## 2025-04-08 NOTE — ED ADULT NURSE REASSESSMENT NOTE - NS ED NURSE REASSESS COMMENT FT1
report received from BRUCE Garcia. pt s/p  c/o bleeding to surgical incision that began while in the shower. bleeding controlled at this time. pt given hospital gown/OB supplies/wipes. awaiting CT.

## 2025-04-08 NOTE — ADVANCED PRACTICE NURSE CONSULT - RECOMMEDATIONS
At this time, patient very anxious in regards to learning wound care. Patient states she does not believe she will be able to tolerate doing her own dressing changes; patient unable to visualize wound during assessment with WOCN. Patient requesting home care services for assistance with wound care changes. 2 weeks of supplies left at bedside. Requested primary RN request case management to discuss homecare options; if patient unable to receive VNS for wound care, patient will need to establish a family member to assist with wound care changes.     Topical Recommendations    L Lateral transverse abdomen: Cleanse with NS, pat dry. Apply Liquid barrier film to periwound skin (allow to dry). Pack with hydrofiber AG ribbon (aquacel AG ribbon), leaving 2" exposed for retrieval. Secure with silicone foam with borders. Change daily and PRN if soiled.    Plan of care discussed with patient,  and primary RN at bedside. All questions answered.    Please contact Wound Care Service Line if we can be of further assistance (ext [phone]).

## 2025-04-08 NOTE — DISCHARGE NOTE NURSING/CASE MANAGEMENT/SOCIAL WORK - NSDCPEFALRISK_GEN_ALL_CORE
For information on Fall & Injury Prevention, visit: [email or web address] OR  [email or web address] OR  https://www.cdc.gov/steadi/patient.html

## 2025-04-08 NOTE — H&P ADULT - ASSESSMENT
24yo  POD#11 s/p pLTCS for failed IOL s/p sPEC/Mg meeting criteria with severe range BPs presents with surgical wound site bleeding. Pt afebrile, vitals stable. Physical exam remarkable for 0.5cm defect at left corner of incision draining dark red blood, approximately 80ccs expressed. Defect tracks approx 4cm to the right and fascia appears to be intact.. CTAP demonstrates 10.4x4cm rectus sheath hematoma. Pt to be admitted for management and possible IR drainage    #rectus sheath hematoma  - Admit to Antepartum  - Iodoform dressing replaced with Aquacel dressing   - Keep NPO  - IR Consult in AM for possible drainage  - Wound care consult  - LR@125    #sPEC  - BPs 120s-130s/80-90s  - Denies PEC symptoms  - c/w Lab 600TID, Hydral 50mg BID, Procardia 60mg BID  - S/p Mag during last admission  - Monitor BPs    #Postpartum  - continue with PO analgesia  - increase ambulation  - encourage incentive spirometry          Ami Cooper PGY2   D/w Dr. Downey

## 2025-04-08 NOTE — ED ADULT NURSE REASSESSMENT NOTE - NS ED NURSE REASSESS COMMENT FT1
Received pt in bed A and Ox 3 in NAD c/o lower abd pressure, medicated for pain, comfort measures provided, breast pump provided, reached out to covering OB resident # [phone] no plan for IR procedure, plan for nursing wound care consult/teaching and then dc home. pending face to face with covering resident, wound assessment deferred.

## 2025-04-08 NOTE — DISCHARGE NOTE PROVIDER - NSDCFUADDINST_GEN_ALL_CORE_FT
Attend your incision check in 1 week with Dr. Salinas. Monitor for fever, redness at the incision, or pus draining from the incision. Come to the ED if you develop these symptoms.     Continue to take your antibiotics. Your doctor will update you on the results of the wound culture swab.    You received magnesium during your prior admission to prevent eclamptic seizures. You were started on blood pressure medications which you will continue at home. Continue to take your blood pressures at home 3 times a day: if readings are greater than 160/110, that is an emergency and you need to return to the hospital. If you have readings greater than or equal to 150/100, please call the clinic. If you develop severe headache unresponsive to pain medication, vision changes, or upper abdominal pain please come to the hospital.    Make your follow-up appointment with your doctor as ordered in two weeks for wound check. No heavy lifting, driving, or strenuous activity for 6 weeks. Nothing per vagina such as tampons, intercourse, douches or tub baths for 6 weeks or until you see your doctor. Call your doctor with any signs and symptoms of infection such as fever, chills, nausea or vomiting. Call your doctor with redness or swelling at the incision site, fluid leakage or wound separation. Call your doctor if you’re unable to tolerate food, increase in vaginal bleeding, or have difficulty urinating. Call your doctor if you have pain that is not relieved by your prescribed medications. Notify your doctor with any other concerns.

## 2025-04-08 NOTE — CONSULT NOTE ADULT - SUBJECTIVE AND OBJECTIVE BOX
Patient is a 25y old  Female who presents with a chief complaint of bleeding at surgical site.     HPI: 26yo  POD#11 s/p pLTCS for failed IOL s/p sPEC/Mg meeting criteria with severe range BPs, presented to ED with surgical wound site bleeding. Seen by GYN, wound defect noted to be 0.5cm, approx 80cc dark red blood was expressed. CT A/P was performed demonstrating large rectus sheath hematoma, IR consulted for drainage.     Vital Signs Last 24 Hrs  T(C): 36.7 (2025 04:25), Max: 36.7 (2025 04:25)  T(F): 98 (2025 04:25), Max: 98 (2025 04:25)  HR: 83 (2025 04:25) (83 - 100)  BP: 137/98 (2025 04:25) (129/80 - 137/98)  RR: 18 (2025 04:25) (17 - 18)  SpO2: 100% (2025 04:25) (97% - 100%)    Parameters below as of 2025 04:25  Patient On (Oxygen Delivery Method): room air      (2025 05:01)  REVIEW OF SYSTEMS: See HPI.     PAST MEDICAL & SURGICAL HISTORY:  Migraines      Allergies  No Known Allergies  Intolerances    MEDICATIONS  (STANDING):  acetaminophen   IVPB .. 1000 milliGRAM(s) IV Intermittent every 6 hours  acetaminophen   IVPB .. 1000 milliGRAM(s) IV Intermittent once  hydrALAZINE 50 milliGRAM(s) Oral every 12 hours  labetalol 600 milliGRAM(s) Oral every 8 hours  lactated ringers. 1000 milliLiter(s) (125 mL/Hr) IV Continuous <Continuous>  NIFEdipine XL 60 milliGRAM(s) Oral every 12 hours    MEDICATIONS  (PRN):  ketorolac   Injectable 15 milliGRAM(s) IV Push every 6 hours PRN Severe Pain (7 - 10)    SOCIAL HISTORY:  FAMILY HISTORY:  PHYSICAL EXAM:    Vital Signs Last 24 Hrs  T(C): 36.6 (2025 07:32), Max: 36.7 (2025 04:25)  T(F): 97.9 (2025 07:32), Max: 98 (2025 04:25)  HR: 85 (2025 07:32) (83 - 100)  BP: 116/86 (2025 07:32) (116/86 - 137/98)  RR: 16 (2025 07:32) (16 - 18)  SpO2: 99% (2025 07:32) (97% - 100%)    Parameters below as of 2025 07:32  Patient On (Oxygen Delivery Method): room air    LABS:                        10.6   13.64 )-----------( 369      ( 2025 00:50 )             32.7         139  |  105  |  31[H]  ----------------------------<  88  4.1   |  21[L]  |  0.67    Ca    8.9      2025 00:50    TPro  6.2  /  Alb  3.3  /  TBili  0.4  /  DBili  x   /  AST  16  /  ALT  16  /  AlkPhos  135[H]  08    PT/INR - ( 2025 01:00 )   PT: 10.0 sec;   INR: <0.90 ratio         PTT - ( 2025 01:00 )  PTT:26.5 sec  Urinalysis Basic - ( 2025 00:50 )    Color: x / Appearance: x / SG: x / pH: x  Gluc: 88 mg/dL / Ketone: x  / Bili: x / Urobili: x   Blood: x / Protein: x / Nitrite: x   Leuk Esterase: x / RBC: x / WBC x   Sq Epi: x / Non Sq Epi: x / Bacteria: x    RADIOLOGY & ADDITIONAL STUDIES:  < from: CT Abdomen and Pelvis w/ IV Cont (25 @ 04:05) >  FINDINGS:  LOWER CHEST: Within normal limits.    LIVER: Within normal limits.  BILE DUCTS: Normal caliber.  GALLBLADDER: Within normal limits.  SPLEEN: Within normal limits.  PANCREAS: Within normal limits.  ADRENALS: Within normal limits.  KIDNEYS/URETERS: Within normal limits.    BLADDER: Within normal limits.  REPRODUCTIVE ORGANS: Post  changes in the lower uterine segment.   No evidence for bladder flap hematoma or collection.    BOWEL: No bowel obstruction. Appendix is normal.  PERITONEUM/RETROPERITONEUM: Within normal limits.  VESSELS: Within normal limits.  LYMPH NODES: No lymphadenopathy.  ABDOMINAL WALL: Postsurgical change in the ventral abdominal wall status   post Pfannenstiel incision. Hematoma of the rectus sheath which measures   up to 10.4 x 4.0, containing punctate focus of gas.  BONES: Within normal limits.    IMPRESSION:  Post  change in the ventral abdominal wall along with a large   rectus sheath hematoma.        --- End of Report ---        < end of copied text >

## 2025-04-08 NOTE — PROGRESS NOTE ADULT - ASSESSMENT
26yo  POD#11 s/p pLTCS for failed IOL s/p sPEC/Mg meeting criteria with severe range BPs presents with surgical wound site bleeding. Pt afebrile, vitals stable. Physical exam remarkable for 0.5cm defect at left corner of incision draining dark red blood, approximately 80ccs expressed. Defect tracks approx 4cm to the right and fascia appears to be intact.. CTAP demonstrates 10.4x4cm rectus sheath hematoma. IIR consulted, do not recommend drainage at this time. Wound care saw patient and packed wound. Pt continues to be afebrile and stable.    #rectus sheath hematoma  - wound care consulted, packed wound  - PT to have outpatient visiting nursing pack wound as per wound care instructions   - IR recommending conservative management at this time  - encourage heat packs and abdominal binder    #sPEC  - BPs 120s-130s/80-90s  - Denies PEC symptoms  - c/w Lab 600TID, Hydral 50mg BID, Procardia 60mg BID  - S/p Mag during last admission    #Postpartum  - continue with PO analgesia  - increase ambulation  - discharge planning    D/w Dr. Kadeem Gibson PGY3     24yo  POD#11 s/p pLTCS for failed IOL s/p sPEC/Mg meeting criteria with severe range BPs presents with surgical wound site bleeding. Pt afebrile, vitals stable. Physical exam remarkable for 0.5cm defect at left corner of incision draining dark red blood, approximately 80ccs expressed. Defect tracks approx 4cm to the right and fascia appears to be intact.. CTAP demonstrates 10.4x4cm rectus sheath hematoma. IIR consulted, do not recommend drainage at this time. Wound care saw patient and packed wound. Pt continues to be afebrile and stable.    #rectus sheath hematoma  - wound care consulted, packed wound  - PT to have outpatient visiting nursing pack wound as per wound care instructions   - IR recommending conservative management at this time  - encourage heat packs and abdominal binder  - wound culture pending  - Pt to be discharged on Keflex x10d for infection prophylaxis  - Pt to have close follow up in 1 week in the office    #sPEC  - BPs 120s-130s/80-90s  - Denies PEC symptoms  - c/w Lab 600TID, Hydral 50mg BID, Procardia 60mg BID  - S/p Mag during last admission    #Postpartum  - continue with PO analgesia  - increase ambulation  - discharge planning    Plan per Dr. Kadeem Gibson PGY3

## 2025-04-11 ENCOUNTER — APPOINTMENT (OUTPATIENT)
Dept: ANTEPARTUM | Facility: CLINIC | Age: 25
End: 2025-04-11

## 2025-04-13 LAB
CULTURE RESULTS: ABNORMAL
SPECIMEN SOURCE: SIGNIFICANT CHANGE UP

## 2025-04-14 ENCOUNTER — NON-APPOINTMENT (OUTPATIENT)
Age: 25
End: 2025-04-14

## 2025-04-15 ENCOUNTER — APPOINTMENT (OUTPATIENT)
Dept: OBGYN | Facility: CLINIC | Age: 25
End: 2025-04-15
Payer: COMMERCIAL

## 2025-04-15 ENCOUNTER — APPOINTMENT (OUTPATIENT)
Dept: CARDIOLOGY | Facility: CLINIC | Age: 25
End: 2025-04-15
Payer: COMMERCIAL

## 2025-04-15 VITALS
BODY MASS INDEX: 18.95 KG/M2 | HEIGHT: 64 IN | OXYGEN SATURATION: 98 % | TEMPERATURE: 98.6 F | HEART RATE: 104 BPM | WEIGHT: 111 LBS | DIASTOLIC BLOOD PRESSURE: 84 MMHG | SYSTOLIC BLOOD PRESSURE: 100 MMHG

## 2025-04-15 DIAGNOSIS — G43.909 MIGRAINE, UNSPECIFIED, NOT INTRACTABLE, W/OUT STATUS MIGRAINOSUS: ICD-10-CM

## 2025-04-15 DIAGNOSIS — Z13.228 ENCOUNTER FOR SCREENING FOR OTHER METABOLIC DISORDERS: ICD-10-CM

## 2025-04-15 DIAGNOSIS — O14.90 UNSPECIFIED PRE-ECLAMPSIA, UNSPECIFIED TRIMESTER: ICD-10-CM

## 2025-04-15 DIAGNOSIS — D64.9 ANEMIA, UNSPECIFIED: ICD-10-CM

## 2025-04-15 DIAGNOSIS — D22.9 MELANOCYTIC NEVI, UNSPECIFIED: ICD-10-CM

## 2025-04-15 LAB
25(OH)D3 SERPL-MCNC: 9.6 NG/ML
ALBUMIN SERPL ELPH-MCNC: 3.9 G/DL
ALP BLD-CCNC: 128 U/L
ALT SERPL-CCNC: 21 U/L
ANION GAP SERPL CALC-SCNC: 13 MMOL/L
APPEARANCE: ABNORMAL
AST SERPL-CCNC: 15 U/L
BACTERIA: NEGATIVE /HPF
BASOPHILS # BLD AUTO: 0.09 K/UL
BASOPHILS NFR BLD AUTO: 1.5 %
BILIRUB DIRECT SERPL-MCNC: 0.1 MG/DL
BILIRUB INDIRECT SERPL-MCNC: 0.2 MG/DL
BILIRUB SERPL-MCNC: 0.3 MG/DL
BILIRUBIN URINE: NEGATIVE
BLOOD URINE: ABNORMAL
BUN SERPL-MCNC: 20 MG/DL
CALCIUM SERPL-MCNC: 9.6 MG/DL
CAST: 2 /LPF
CHLORIDE SERPL-SCNC: 105 MMOL/L
CHOLEST SERPL-MCNC: 323 MG/DL
CO2 SERPL-SCNC: 23 MMOL/L
COLOR: NORMAL
CREAT SERPL-MCNC: 0.76 MG/DL
EGFRCR SERPLBLD CKD-EPI 2021: 111 ML/MIN/1.73M2
EOSINOPHIL # BLD AUTO: 0.24 K/UL
EOSINOPHIL NFR BLD AUTO: 4.1 %
EPITHELIAL CELLS: 8 /HPF
ESTIMATED AVERAGE GLUCOSE: 97 MG/DL
FERRITIN SERPL-MCNC: 107 NG/ML
FOLATE SERPL-MCNC: 9.8 NG/ML
GLUCOSE QUALITATIVE U: NEGATIVE MG/DL
GLUCOSE SERPL-MCNC: 92 MG/DL
HBA1C MFR BLD HPLC: 5 %
HCT VFR BLD CALC: 39.8 %
HDLC SERPL-MCNC: 69 MG/DL
HGB BLD-MCNC: 12.1 G/DL
IMM GRANULOCYTES NFR BLD AUTO: 0.2 %
IRON SATN MFR SERPL: 24 %
IRON SERPL-MCNC: 89 UG/DL
KETONES URINE: NEGATIVE MG/DL
LDLC SERPL-MCNC: 222 MG/DL
LEUKOCYTE ESTERASE URINE: NEGATIVE
LYMPHOCYTES # BLD AUTO: 1.3 K/UL
LYMPHOCYTES NFR BLD AUTO: 22.1 %
MAGNESIUM SERPL-MCNC: 2.2 MG/DL
MAN DIFF?: NORMAL
MCHC RBC-ENTMCNC: 29.6 PG
MCHC RBC-ENTMCNC: 30.4 G/DL
MCV RBC AUTO: 97.3 FL
MICROSCOPIC-UA: NORMAL
MONOCYTES # BLD AUTO: 0.43 K/UL
MONOCYTES NFR BLD AUTO: 7.3 %
NEUTROPHILS # BLD AUTO: 3.82 K/UL
NEUTROPHILS NFR BLD AUTO: 64.8 %
NITRITE URINE: NEGATIVE
NONHDLC SERPL-MCNC: 254 MG/DL
PH URINE: 5.5
PHOSPHATE SERPL-MCNC: 3.6 MG/DL
PLATELET # BLD AUTO: 595 K/UL
POTASSIUM SERPL-SCNC: 4.4 MMOL/L
PROT SERPL-MCNC: 6.6 G/DL
PROTEIN URINE: 300 MG/DL
RBC # BLD: 4.09 M/UL
RBC # FLD: 16.5 %
RED BLOOD CELLS URINE: NORMAL /HPF
REVIEW: NORMAL
SODIUM SERPL-SCNC: 141 MMOL/L
SPECIFIC GRAVITY URINE: 1.03
T4 FREE SERPL-MCNC: 1.2 NG/DL
TIBC SERPL-MCNC: 372 UG/DL
TRANSFERRIN SERPL-MCNC: 282 MG/DL
TRIGL SERPL-MCNC: 170 MG/DL
TSH SERPL-ACNC: 2.48 UIU/ML
UIBC SERPL-MCNC: 283 UG/DL
UROBILINOGEN URINE: 0.2 MG/DL
VIT B12 SERPL-MCNC: 369 PG/ML
WBC # FLD AUTO: 5.89 K/UL
WHITE BLOOD CELLS URINE: 9 /HPF
YEAST-LIKE CELLS: PRESENT

## 2025-04-15 PROCEDURE — G2211 COMPLEX E/M VISIT ADD ON: CPT

## 2025-04-15 PROCEDURE — 99214 OFFICE O/P EST MOD 30 MIN: CPT

## 2025-04-15 PROCEDURE — 99213 OFFICE O/P EST LOW 20 MIN: CPT

## 2025-04-15 PROCEDURE — 93000 ELECTROCARDIOGRAM COMPLETE: CPT

## 2025-04-15 RX ORDER — IRON POLYSACCHARIDE COMPLEX 150 MG
150 CAPSULE ORAL
Qty: 30 | Refills: 2 | Status: ACTIVE | COMMUNITY
Start: 2025-04-15

## 2025-04-15 RX ORDER — NIFEDIPINE 60 MG/1
60 TABLET, EXTENDED RELEASE ORAL
Qty: 60 | Refills: 0 | Status: ACTIVE | COMMUNITY
Start: 2025-04-15

## 2025-04-15 RX ORDER — HYDRALAZINE HYDROCHLORIDE 50 MG/1
50 TABLET ORAL
Qty: 90 | Refills: 0 | Status: ACTIVE | COMMUNITY
Start: 2025-04-15

## 2025-04-15 RX ORDER — MULTIVIT-MIN/IRON/FOLIC ACID/K 18-600-40
500 CAPSULE ORAL
Qty: 30 | Refills: 2 | Status: ACTIVE | COMMUNITY
Start: 2025-04-15

## 2025-04-15 RX ORDER — LABETALOL HYDROCHLORIDE 300 MG/1
300 TABLET, FILM COATED ORAL
Qty: 180 | Refills: 0 | Status: ACTIVE | COMMUNITY
Start: 2025-04-15

## 2025-04-22 DIAGNOSIS — E55.9 VITAMIN D DEFICIENCY, UNSPECIFIED: ICD-10-CM

## 2025-04-23 PROBLEM — E55.9 VITAMIN D DEFICIENCY: Status: ACTIVE | Noted: 2025-04-23

## 2025-04-23 LAB
CREAT SPEC-SCNC: 196 MG/DL
HGB A MFR BLD: 97.9 %
HGB A2 MFR BLD: 2.1 %
HGB FRACT BLD-IMP: NORMAL
MICROALBUMIN 24H UR DL<=1MG/L-MCNC: 186.7 MG/DL
MICROALBUMIN/CREAT 24H UR-RTO: 953 MG/G

## 2025-04-23 RX ORDER — CHOLECALCIFEROL (VITAMIN D3) 1250 MCG
1.25 MG CAPSULE ORAL
Qty: 6 | Refills: 0 | Status: ACTIVE | COMMUNITY
Start: 2025-04-22 | End: 1900-01-01

## 2025-04-29 ENCOUNTER — APPOINTMENT (OUTPATIENT)
Dept: INTERNAL MEDICINE | Facility: CLINIC | Age: 25
End: 2025-04-29
Payer: COMMERCIAL

## 2025-04-29 VITALS
BODY MASS INDEX: 18.44 KG/M2 | HEIGHT: 64 IN | DIASTOLIC BLOOD PRESSURE: 100 MMHG | WEIGHT: 108 LBS | OXYGEN SATURATION: 99 % | SYSTOLIC BLOOD PRESSURE: 130 MMHG

## 2025-04-29 VITALS — SYSTOLIC BLOOD PRESSURE: 120 MMHG | DIASTOLIC BLOOD PRESSURE: 87 MMHG

## 2025-04-29 DIAGNOSIS — D75.839 THROMBOCYTOSIS, UNSPECIFIED: ICD-10-CM

## 2025-04-29 DIAGNOSIS — O14.90 UNSPECIFIED PRE-ECLAMPSIA, UNSPECIFIED TRIMESTER: ICD-10-CM

## 2025-04-29 DIAGNOSIS — I10 ESSENTIAL (PRIMARY) HYPERTENSION: ICD-10-CM

## 2025-04-29 DIAGNOSIS — R80.9 PROTEINURIA, UNSPECIFIED: ICD-10-CM

## 2025-04-29 DIAGNOSIS — D64.9 ANEMIA, UNSPECIFIED: ICD-10-CM

## 2025-04-29 DIAGNOSIS — O35.9XX0 MATERNAL CARE FOR (SUSPECTED) FETAL ABNORMALITY AND DAMAGE, UNSPECIFIED, NOT APPLICABLE OR UNSPECIFIED: ICD-10-CM

## 2025-04-29 PROCEDURE — G2211 COMPLEX E/M VISIT ADD ON: CPT

## 2025-04-29 PROCEDURE — 99204 OFFICE O/P NEW MOD 45 MIN: CPT

## 2025-04-30 LAB
ALBUMIN SERPL ELPH-MCNC: 4.5 G/DL
ALP BLD-CCNC: 122 U/L
ALT SERPL-CCNC: 15 U/L
ANION GAP SERPL CALC-SCNC: 15 MMOL/L
APPEARANCE: CLEAR
AST SERPL-CCNC: 19 U/L
BACTERIA: NEGATIVE /HPF
BASOPHILS # BLD AUTO: 0.05 K/UL
BASOPHILS NFR BLD AUTO: 0.9 %
BILIRUB SERPL-MCNC: 0.2 MG/DL
BILIRUBIN URINE: NEGATIVE
BLOOD URINE: ABNORMAL
BUN SERPL-MCNC: 17 MG/DL
CALCIUM SERPL-MCNC: 10 MG/DL
CAST: 0 /LPF
CHLORIDE SERPL-SCNC: 103 MMOL/L
CO2 SERPL-SCNC: 24 MMOL/L
COLOR: YELLOW
CREAT SERPL-MCNC: 0.72 MG/DL
CREAT SPEC-SCNC: 111 MG/DL
EGFRCR SERPLBLD CKD-EPI 2021: 119 ML/MIN/1.73M2
EOSINOPHIL # BLD AUTO: 0.18 K/UL
EOSINOPHIL NFR BLD AUTO: 3.1 %
EPITHELIAL CELLS: 3 /HPF
GLUCOSE QUALITATIVE U: NEGATIVE MG/DL
GLUCOSE SERPL-MCNC: 103 MG/DL
HCT VFR BLD CALC: 44.3 %
HGB BLD-MCNC: 13.6 G/DL
IMM GRANULOCYTES NFR BLD AUTO: 0.2 %
KETONES URINE: NEGATIVE MG/DL
LEUKOCYTE ESTERASE URINE: ABNORMAL
LYMPHOCYTES # BLD AUTO: 1.72 K/UL
LYMPHOCYTES NFR BLD AUTO: 29.9 %
MAN DIFF?: NORMAL
MCHC RBC-ENTMCNC: 29.3 PG
MCHC RBC-ENTMCNC: 30.7 G/DL
MCV RBC AUTO: 95.5 FL
MICROALBUMIN 24H UR DL<=1MG/L-MCNC: 56.6 MG/DL
MICROALBUMIN/CREAT 24H UR-RTO: 509 MG/G
MICROSCOPIC-UA: NORMAL
MONOCYTES # BLD AUTO: 0.46 K/UL
MONOCYTES NFR BLD AUTO: 8 %
NEUTROPHILS # BLD AUTO: 3.33 K/UL
NEUTROPHILS NFR BLD AUTO: 57.9 %
NITRITE URINE: NEGATIVE
PH URINE: 7
PLATELET # BLD AUTO: 390 K/UL
POTASSIUM SERPL-SCNC: 4.7 MMOL/L
PROT SERPL-MCNC: 7.2 G/DL
PROTEIN URINE: 100 MG/DL
RBC # BLD: 4.64 M/UL
RBC # FLD: 14.9 %
RED BLOOD CELLS URINE: 2 /HPF
SODIUM SERPL-SCNC: 141 MMOL/L
SPECIFIC GRAVITY URINE: 1.02
UROBILINOGEN URINE: 0.2 MG/DL
WBC # FLD AUTO: 5.75 K/UL
WHITE BLOOD CELLS URINE: 2 /HPF

## 2025-05-02 ENCOUNTER — NON-APPOINTMENT (OUTPATIENT)
Age: 25
End: 2025-05-02

## 2025-05-02 LAB
CREAT SPEC-SCNC: 109 MG/DL
MICROALBUMIN 24H UR DL<=1MG/L-MCNC: 55.4 MG/DL
MICROALBUMIN/CREAT 24H UR-RTO: 508 MG/G

## 2025-05-13 ENCOUNTER — APPOINTMENT (OUTPATIENT)
Dept: INTERNAL MEDICINE | Facility: CLINIC | Age: 25
End: 2025-05-13

## 2025-05-14 ENCOUNTER — APPOINTMENT (OUTPATIENT)
Dept: OBGYN | Facility: CLINIC | Age: 25
End: 2025-05-14

## 2025-05-16 ENCOUNTER — APPOINTMENT (OUTPATIENT)
Dept: CARDIOLOGY | Facility: CLINIC | Age: 25
End: 2025-05-16
Payer: COMMERCIAL

## 2025-05-16 ENCOUNTER — NON-APPOINTMENT (OUTPATIENT)
Age: 25
End: 2025-05-16

## 2025-05-16 ENCOUNTER — APPOINTMENT (OUTPATIENT)
Dept: INTERNAL MEDICINE | Facility: CLINIC | Age: 25
End: 2025-05-16

## 2025-05-16 VITALS
BODY MASS INDEX: 18.78 KG/M2 | WEIGHT: 110 LBS | HEIGHT: 64 IN | OXYGEN SATURATION: 99 % | DIASTOLIC BLOOD PRESSURE: 90 MMHG | SYSTOLIC BLOOD PRESSURE: 110 MMHG | HEART RATE: 102 BPM

## 2025-05-16 VITALS — DIASTOLIC BLOOD PRESSURE: 85 MMHG | SYSTOLIC BLOOD PRESSURE: 118 MMHG

## 2025-05-16 DIAGNOSIS — O14.90 UNSPECIFIED PRE-ECLAMPSIA, UNSPECIFIED TRIMESTER: ICD-10-CM

## 2025-05-16 DIAGNOSIS — I10 ESSENTIAL (PRIMARY) HYPERTENSION: ICD-10-CM

## 2025-05-16 DIAGNOSIS — D75.839 THROMBOCYTOSIS, UNSPECIFIED: ICD-10-CM

## 2025-05-16 DIAGNOSIS — R00.0 TACHYCARDIA, UNSPECIFIED: ICD-10-CM

## 2025-05-16 DIAGNOSIS — S31.109A UNSPECIFIED OPEN WOUND OF ABDOMINAL WALL, UNSPECIFIED QUADRANT W/OUT PENETRATION INTO PERITONEAL CAVITY, INITIAL ENCOUNTER: ICD-10-CM

## 2025-05-16 DIAGNOSIS — F41.9 ANXIETY DISORDER, UNSPECIFIED: ICD-10-CM

## 2025-05-16 DIAGNOSIS — D64.9 ANEMIA, UNSPECIFIED: ICD-10-CM

## 2025-05-16 DIAGNOSIS — R80.9 PROTEINURIA, UNSPECIFIED: ICD-10-CM

## 2025-05-16 PROCEDURE — 93000 ELECTROCARDIOGRAM COMPLETE: CPT

## 2025-05-16 PROCEDURE — G2211 COMPLEX E/M VISIT ADD ON: CPT | Mod: NC

## 2025-05-16 PROCEDURE — 99214 OFFICE O/P EST MOD 30 MIN: CPT

## 2025-05-16 PROCEDURE — 93308 TTE F-UP OR LMTD: CPT

## 2025-05-20 ENCOUNTER — APPOINTMENT (OUTPATIENT)
Dept: OBGYN | Facility: CLINIC | Age: 25
End: 2025-05-20
Payer: COMMERCIAL

## 2025-05-20 PROCEDURE — 0503F POSTPARTUM CARE VISIT: CPT

## 2025-05-20 PROCEDURE — 81002 URINALYSIS NONAUTO W/O SCOPE: CPT
